# Patient Record
Sex: FEMALE | Race: WHITE | NOT HISPANIC OR LATINO | URBAN - METROPOLITAN AREA
[De-identification: names, ages, dates, MRNs, and addresses within clinical notes are randomized per-mention and may not be internally consistent; named-entity substitution may affect disease eponyms.]

---

## 2023-05-20 LAB
ABO GROUP (TYPE) IN BLOOD: NORMAL
ANTIBODY SCREEN: NORMAL
ESTIMATED AVERAGE GLUCOSE FOR HBA1C: 105 MG/DL
HEMOGLOBIN A1C/HEMOGLOBIN TOTAL IN BLOOD: 5.3 %
HEPATITIS B VIRUS SURFACE AG PRESENCE IN SERUM: NONREACTIVE
HEPATITIS C VIRUS AB PRESENCE IN SERUM: NONREACTIVE
HIV 1/ 2 AG/AB SCREEN: NONREACTIVE
RH FACTOR: NORMAL
THYROTROPIN (MIU/L) IN SER/PLAS BY DETECTION LIMIT <= 0.05 MIU/L: 7.6 MIU/L (ref 0.44–3.98)
THYROXINE (T4) FREE (NG/DL) IN SER/PLAS: 0.84 NG/DL (ref 0.61–1.12)

## 2023-05-21 LAB
CHLAMYDIA TRACH., AMPLIFIED: NEGATIVE
N. GONORRHEA, AMPLIFIED: NEGATIVE
SYPHILIS TOTAL AB: NONREACTIVE

## 2023-06-12 LAB — PROGESTERONE (NG/ML) IN SER/PLAS: 15.9 NG/ML

## 2023-06-21 LAB — CHORIOGONADOTROPIN (MIU/ML) IN SER/PLAS: 39 MIU/ML

## 2023-06-23 LAB — CHORIOGONADOTROPIN (MIU/ML) IN SER/PLAS: 60 MIU/ML

## 2023-06-27 LAB — CHORIOGONADOTROPIN (MIU/ML) IN SER/PLAS: 388 MIU/ML

## 2023-06-29 LAB — CHORIOGONADOTROPIN (MIU/ML) IN SER/PLAS: 1012 MIU/ML

## 2023-07-07 LAB
ALANINE AMINOTRANSFERASE (SGPT) (U/L) IN SER/PLAS: 30 U/L (ref 7–45)
ALBUMIN (G/DL) IN SER/PLAS: 4 G/DL (ref 3.4–5)
ALKALINE PHOSPHATASE (U/L) IN SER/PLAS: 50 U/L (ref 33–110)
ANION GAP IN SER/PLAS: 13 MMOL/L (ref 10–20)
ASPARTATE AMINOTRANSFERASE (SGOT) (U/L) IN SER/PLAS: 20 U/L (ref 9–39)
BILIRUBIN TOTAL (MG/DL) IN SER/PLAS: 0.3 MG/DL (ref 0–1.2)
CALCIUM (MG/DL) IN SER/PLAS: 8.6 MG/DL (ref 8.6–10.3)
CARBON DIOXIDE, TOTAL (MMOL/L) IN SER/PLAS: 25 MMOL/L (ref 21–32)
CHLORIDE (MMOL/L) IN SER/PLAS: 102 MMOL/L (ref 98–107)
CHORIOGONADOTROPIN (MIU/ML) IN SER/PLAS: 82 MIU/ML
CREATININE (MG/DL) IN SER/PLAS: 0.62 MG/DL (ref 0.5–1.05)
ERYTHROCYTE DISTRIBUTION WIDTH (RATIO) BY AUTOMATED COUNT: 14.7 % (ref 11.5–14.5)
ERYTHROCYTE MEAN CORPUSCULAR HEMOGLOBIN CONCENTRATION (G/DL) BY AUTOMATED: 31.5 G/DL (ref 32–36)
ERYTHROCYTE MEAN CORPUSCULAR VOLUME (FL) BY AUTOMATED COUNT: 83 FL (ref 80–100)
ERYTHROCYTES (10*6/UL) IN BLOOD BY AUTOMATED COUNT: 4.45 X10E12/L (ref 4–5.2)
GFR FEMALE: >90 ML/MIN/1.73M2
GLUCOSE (MG/DL) IN SER/PLAS: 81 MG/DL (ref 74–99)
HEMATOCRIT (%) IN BLOOD BY AUTOMATED COUNT: 37.1 % (ref 36–46)
HEMOGLOBIN (G/DL) IN BLOOD: 11.7 G/DL (ref 12–16)
LEUKOCYTES (10*3/UL) IN BLOOD BY AUTOMATED COUNT: 8.2 X10E9/L (ref 4.4–11.3)
NRBC (PER 100 WBCS) BY AUTOMATED COUNT: 0 /100 WBC (ref 0–0)
PLATELETS (10*3/UL) IN BLOOD AUTOMATED COUNT: 293 X10E9/L (ref 150–450)
POTASSIUM (MMOL/L) IN SER/PLAS: 4 MMOL/L (ref 3.5–5.3)
PROTEIN TOTAL: 6.7 G/DL (ref 6.4–8.2)
SODIUM (MMOL/L) IN SER/PLAS: 136 MMOL/L (ref 136–145)
UREA NITROGEN (MG/DL) IN SER/PLAS: 6 MG/DL (ref 6–23)

## 2023-07-13 LAB — CHORIOGONADOTROPIN (MIU/ML) IN SER/PLAS: 8 MIU/ML

## 2023-07-20 LAB — CHORIOGONADOTROPIN (MIU/ML) IN SER/PLAS: 2 MIU/ML

## 2023-08-03 LAB
ESTRADIOL (PG/ML) IN SER/PLAS: 69 PG/ML
PROGESTERONE (NG/ML) IN SER/PLAS: 0.4 NG/ML

## 2023-09-05 LAB
PROGESTERONE (NG/ML) IN SER/PLAS: 18.3 NG/ML
THYROTROPIN (MIU/L) IN SER/PLAS BY DETECTION LIMIT <= 0.05 MIU/L: 1.94 MIU/L (ref 0.44–3.98)

## 2023-09-18 LAB — CHORIOGONADOTROPIN (MIU/ML) IN SER/PLAS: <2 MIU/ML

## 2023-10-03 DIAGNOSIS — N97.9 FEMALE INFERTILITY: ICD-10-CM

## 2023-10-09 ENCOUNTER — CLINICAL SUPPORT (OUTPATIENT)
Dept: ENDOCRINOLOGY | Facility: CLINIC | Age: 23
End: 2023-10-09
Payer: COMMERCIAL

## 2023-10-09 ENCOUNTER — LAB (OUTPATIENT)
Dept: LAB | Facility: LAB | Age: 23
End: 2023-10-09
Payer: COMMERCIAL

## 2023-10-09 DIAGNOSIS — N97.9 FEMALE INFERTILITY: ICD-10-CM

## 2023-10-09 LAB — PROGEST SERPL-MCNC: 38.7 NG/ML

## 2023-10-09 PROCEDURE — 84144 ASSAY OF PROGESTERONE: CPT

## 2023-10-09 PROCEDURE — 36415 COLL VENOUS BLD VENIPUNCTURE: CPT

## 2023-10-09 NOTE — PROGRESS NOTES
Patient to have CD23 P4 level after Clomid Letrozole combo cycle for TIC. Will reach out to patient this afternoon with results and plan.   MARISEL ULLOA on 10/9/23 at 9:28 AM.    HUDDLE PROVIDER NOTE  Ultrasound and/or labs reviewed at huddle.   No results found for this or any previous visit (from the past 96 hour(s)).  None  RN to add for huddle and discussion tomorrow.   Dina Lara

## 2023-10-10 ENCOUNTER — CLINICAL SUPPORT (OUTPATIENT)
Dept: ENDOCRINOLOGY | Facility: CLINIC | Age: 23
End: 2023-10-10
Payer: COMMERCIAL

## 2023-10-10 NOTE — PROGRESS NOTES
Patient had CD 23 P4 level drawn that resulted yesterday evening. Will call patient with results and plan.    MARISEL ULLOA on 10/10/23 at 8:33 AM.    Results for orders placed or performed in visit on 10/09/23 (from the past 24 hour(s))   Progesterone   Result Value Ref Range    Progesterone 38.7 ng/mL     Ovulatory P4 level confirmed, patient to call with menses or +pregnancy test    MARY ANNE LING on 10/10/23 at 1:31 PM.      Called patient to review MD plan below. Patient is aware to call the office with a +UPT or period. No questions at this time.    MARISEL ULLOA on 10/10/23 at 2:09 PM.

## 2023-10-16 ENCOUNTER — TELEPHONE (OUTPATIENT)
Dept: MATERNAL FETAL MEDICINE | Facility: CLINIC | Age: 23
End: 2023-10-16
Payer: COMMERCIAL

## 2023-10-16 DIAGNOSIS — Z31.89 ENCOUNTER FOR ARTIFICIAL INSEMINATION: ICD-10-CM

## 2023-10-16 DIAGNOSIS — N97.9 FEMALE INFERTILITY: ICD-10-CM

## 2023-10-16 NOTE — Clinical Note
This patient has her boarding pass signed off from October but Radha didn't put on there that she can do three cycles. So the boarding pass just needs an addendum that she can do two additional cycles. She started her period on Sunday so she will need her clomid and letrozole signed off today. She did her first IUI in October and got pregnant but miscarried then she was cleared to proceed with another IUI. I sent all her orders as well for the IUI.

## 2023-10-16 NOTE — PROGRESS NOTES
Boarding Pass Oral/Injectible with TIC/IUI Checklist    Age: 23 y.o.  No obstetric history on file.  Provider: Radha Smith CNP  Primary RN: Zenobia   Reasons for Treatment: Polycystic Ovarian Syndrome  Last BMI  04/28/23 : 45.70 kg/m²       No past medical history on file.  Ectopic Risk: N    Date Done Consultation Results/Comments   4/28/2023 Medication Protocol Stimulation protocol: Clomid 100 mg & letrozole 5 mg days 3-7/monitoring/IUI/ with HCG trigger + LP prometrium 100 mg BID PV to start 3 days after IUI x 2 additional cycles  Trigger plan: HCG  Adjuncts:  none  Notes: N/A   12/26/23 Insurance Financially Cleared? No coverage  Procedure Order Placed? Yes    N/A  Financial Consult YES   6/29/22 Genetic Carrier Screening Clearance DECLINED SCREEN   7/14/22 MFM Consult Okay to proceed? Yes   N/a Psych Consult Okay to proceed? N/A   6/29/22 Genetics Consult Okay to proceed? yes    Other    Date Done Female Labs Results/Comments   5/20/2023 T&S (Q 1 Year) ABO: O  Rh: POS  Antibody: NEG   5/20/2023 Hep B sAg NONREACTIVE   5/20/2023 Hep C AB NONREACTIVE   5/20/2023 HIV NONREACTIVE   5/20/2023 Syphilis NONREACTIVE   5/20/2023 GC/CT GC: NEGATIVE  CT: NEGATIVE   6/24/2022 Rubella (Q 5 Years) POSITIVE     6/24/2022 Varicella (Q 5 Years) POSITIVE     11/20/2023 TSH 1.59 (Ref range: 0.44 - 3.98 mIU/L)   11/20/2023 HgbA1C 5.1 (Ref range: see below %)   6/29/2022 AMH 5.56   declined Carrier Screening Myriad 2bP:   Declined  N/A   12/27/2022 Uterine Cavity Eval HSG: FL HYSTEROSALPINGOGRAM (12/27/2022):   (Dec 2022) Normal uterine contour without filling defects, bilateral tubal patency     SIS:     Hyster:     7/19/2022 Pap Smear (Q 5 Years)   Negative for Intraepithelial lesion or malignancy.     Mammogram ( > 40) (Q 1 Year)               Date Done Male Labs (required if IUI)  Heath Vaz ([id number]) Results/Comments   10/16/2023 Hep B sAg Nonreactive   10/16/2023 Hep C AB  Nonreactive   10/16/2023 HIV Nonreactive    10/16/2023 Syphilis Nonreactive   10/16/2023 GC/CT GC: Negative  CT: Negative   N/a Carrier Screening   Declined  N/A   5/2022 Semen Analysis  SA: at [facility] May 2022  Volume: 1.2 ml  Concentration: 74.8 million/ml  Motility: 61 %  Total Sperm: 89.76 million  TMC: 54.75  Morphology: 9%   Date Done Miscellaneous Results/Comments   5/20/2023 BMI Checklist  BMI > 40 or < 18 Added to chart:   Yes; Boarding Pass BMI Checklist (BMI > 40 or < 18)    Date Done Testing Results   11/28/2023 CBC Plt: 313 (Ref range: 150 - 450 x10*3/uL)  Hct: 37.2 (Ref range: 36.0 - 46.0 %)   11/28/2023 CMP BUN: 9 (Ref range: 6 - 23 mg/dL)  Cre: 0.72 (Ref range: 0.50 - 1.05 mg/dL)  AST: 30 (Ref range: 9 - 39 U/L)  ALT: 52 (H; Ref range: 7 - 45 U/L)   11/20/2023 HgbA1C 5.1 (Ref range: see below %)   7/14/2022 MFM Clearance (required for BMI > 40 or < 18) Clearance Letter-Provider Reviewed: YES         6/24/22 Weight Loss Consult (must be offered for BMI > 40) Declined       N/A >= 45 Checklist  Added to chart:   No     Boarding pass reviewed with MACKENZIE Herr. Pt is cleared to proceed with Clomid 100 mg and Letrozole 5 mg CD 3-7 with ultrasound monitoring hcg trigger and partner IUI with LP prometrium   LUIS ROGERS on 10/23/23 at 4:01 PM.       Boarding Pass reviewed with PARESH Herr RN and is complete. patient & partner are is cleared for fertility treatment: Clomid 100 mg & letrozole 5 mg days 3-7/monitoring/IUI/ with HCG trigger + LP prometrium 100 mg BID PV to start 3 days after IUI x 2 additional cycles.   12/26/23 at 3:55 PM - SAÚL Chun-CNP

## 2023-10-16 NOTE — TELEPHONE ENCOUNTER
Called patient back. Patient started full flow menses today 10/16. Patient wants to proceed with IUI. Patient will need new boarding pass signed off. Patient's  needs updated std screen. Orders placed. Patient will call back Wednesday to check status to start clomid/letrozole and get scheduled for monitoring for IUI. All orders placed and cycle started aside from ordering meds. Will order meds once bp is signed off. Patient does need a financial consult. Email sent to get patient placed on FC list.   KARI CANCNIO on 10/16/23 at 3:18 PM.

## 2023-10-20 ENCOUNTER — TELEPHONE (OUTPATIENT)
Dept: ENDOCRINOLOGY | Facility: CLINIC | Age: 23
End: 2023-10-20
Payer: COMMERCIAL

## 2023-10-20 DIAGNOSIS — N97.9 FEMALE INFERTILITY: Primary | ICD-10-CM

## 2023-10-20 RX ORDER — LETROZOLE 2.5 MG/1
5 TABLET, FILM COATED ORAL DAILY
Qty: 10 TABLET | Refills: 0 | Status: SHIPPED | OUTPATIENT
Start: 2023-10-20 | End: 2023-10-25

## 2023-10-20 RX ORDER — CLOMIPHENE CITRATE 50 MG/1
50 TABLET ORAL DAILY
Qty: 4 TABLET | Refills: 0 | Status: SHIPPED | OUTPATIENT
Start: 2023-10-20 | End: 2023-10-24

## 2023-10-20 NOTE — TELEPHONE ENCOUNTER
Telephone Encounter    Katy Roldan paged the answering service tonight. She has completed 19 clomid/letrozole TIC cycles and desires to proceed with IUI. Her partner had STD screening this week and she was waiting for a phone call for confirmation. Per RN notes, pt was to call on 10/18 for boarding pass sign off.    Although BP not officially signed off, since patient has been receiving clomid/let in the past, okay to send Rx tonight. Explained to patient that without prior authorization, she may need to pay for IUI out of pocket. Additionally, stated for actual IUI process to be completed she will need her BP signed off. Pt may convert thi to a OI/TIC month if she is unable to complete everything before next cycle. I will send a message to the RN pool and pt to call on Monday for BP sign off.    D/w Dr. Marco Mejia MD PGY 5  Reproductive Endocrinology and Infertility Fellow

## 2023-10-23 ENCOUNTER — TELEPHONE (OUTPATIENT)
Dept: ENDOCRINOLOGY | Facility: CLINIC | Age: 23
End: 2023-10-23
Payer: COMMERCIAL

## 2023-10-23 ENCOUNTER — DOCUMENTATION (OUTPATIENT)
Dept: ENDOCRINOLOGY | Facility: CLINIC | Age: 23
End: 2023-10-23
Payer: COMMERCIAL

## 2023-10-23 RX ORDER — PROGESTERONE 100 MG/1
100 CAPSULE ORAL 2 TIMES DAILY
Qty: 60 CAPSULE | Refills: 1 | Status: SHIPPED | OUTPATIENT
Start: 2023-10-23 | End: 2023-12-26 | Stop reason: SDUPTHER

## 2023-10-23 RX ORDER — CHORIONIC GONADOTROPIN 10000 UNIT
10000 KIT INTRAMUSCULAR ONCE
Qty: 1 EACH | Refills: 0 | Status: SHIPPED | OUTPATIENT
Start: 2023-10-23 | End: 2023-12-27 | Stop reason: SDUPTHER

## 2023-10-23 NOTE — TELEPHONE ENCOUNTER
Pt calling to talk about plan. Pt needs boarding pass signed off. Will review with Radha Smith today. Patient already did letrozole and clomid combo this cycle starting CD 4. Patient will come in for US monitoring for IUI. Patient tentatively scheduled for 10/27 CD 12. Patient has all meds ordered for IUI. Will call patient if anything changes with the plan after boarding pass is signed off.   KARI CANCINO on 10/23/23 at 3:48 PM.

## 2023-10-24 PROBLEM — N97.0 FEMALE INFERTILITY ASSOCIATED WITH ANOVULATION: Status: ACTIVE | Noted: 2023-10-24

## 2023-10-24 PROBLEM — E03.9 ACQUIRED HYPOTHYROIDISM: Status: ACTIVE | Noted: 2017-03-11

## 2023-10-24 PROBLEM — E66.01 OBESITY, CLASS III, BMI 40-49.9 (MORBID OBESITY) (MULTI): Status: ACTIVE | Noted: 2018-05-22

## 2023-10-24 PROBLEM — F32.A ANXIETY AND DEPRESSION: Status: ACTIVE | Noted: 2017-03-11

## 2023-10-24 PROBLEM — E66.813 OBESITY, CLASS III, BMI 40-49.9 (MORBID OBESITY): Status: ACTIVE | Noted: 2018-05-22

## 2023-10-24 PROBLEM — H52.13 MYOPIA OF BOTH EYES WITH ASTIGMATISM: Status: ACTIVE | Noted: 2021-03-22

## 2023-10-24 PROBLEM — E28.2 PCOS (POLYCYSTIC OVARIAN SYNDROME): Status: ACTIVE | Noted: 2021-05-20

## 2023-10-24 PROBLEM — F41.9 ANXIETY AND DEPRESSION: Status: ACTIVE | Noted: 2017-03-11

## 2023-10-24 PROBLEM — E55.9 VITAMIN D DEFICIENCY: Status: ACTIVE | Noted: 2022-07-12

## 2023-10-24 PROBLEM — E66.813 CLASS 3 SEVERE OBESITY WITH SERIOUS COMORBIDITY AND BODY MASS INDEX (BMI) OF 50.0 TO 59.9 IN ADULT: Status: ACTIVE | Noted: 2018-05-22

## 2023-10-24 PROBLEM — K76.0 NAFL (NONALCOHOLIC FATTY LIVER): Status: ACTIVE | Noted: 2023-10-24

## 2023-10-24 PROBLEM — N91.5 OLIGOMENORRHEA: Status: ACTIVE | Noted: 2023-10-24

## 2023-10-24 PROBLEM — H52.203 MYOPIA OF BOTH EYES WITH ASTIGMATISM: Status: ACTIVE | Noted: 2021-03-22

## 2023-10-24 RX ORDER — MEDROXYPROGESTERONE ACETATE 10 MG/1
10 TABLET ORAL
COMMUNITY
End: 2024-02-20 | Stop reason: WASHOUT

## 2023-10-24 RX ORDER — LEVOTHYROXINE SODIUM 50 UG/1
TABLET ORAL
COMMUNITY
Start: 2020-08-31 | End: 2024-01-26 | Stop reason: ALTCHOICE

## 2023-10-24 RX ORDER — FLUOCINOLONE ACETONIDE 0.1 MG/ML
SOLUTION TOPICAL
COMMUNITY
Start: 2021-05-03 | End: 2024-02-20 | Stop reason: WASHOUT

## 2023-10-24 RX ORDER — SERTRALINE HYDROCHLORIDE 100 MG/1
100 TABLET, FILM COATED ORAL DAILY
COMMUNITY
Start: 2022-12-18 | End: 2024-04-22 | Stop reason: WASHOUT

## 2023-10-24 RX ORDER — NORETHINDRONE AND ETHINYL ESTRADIOL 1 MG-35MCG
KIT ORAL
COMMUNITY
Start: 2021-02-25 | End: 2024-02-20 | Stop reason: WASHOUT

## 2023-10-24 RX ORDER — ALBUTEROL SULFATE 90 UG/1
2 AEROSOL, METERED RESPIRATORY (INHALATION) EVERY 4 HOURS PRN
COMMUNITY
Start: 2014-03-04 | End: 2024-04-22 | Stop reason: WASHOUT

## 2023-10-24 RX ORDER — FLUTICASONE PROPIONATE 50 MCG
2 SPRAY, SUSPENSION (ML) NASAL DAILY
COMMUNITY
Start: 2021-05-20 | End: 2024-02-20 | Stop reason: WASHOUT

## 2023-10-24 RX ORDER — CLOMIPHENE CITRATE 50 MG/1
2 TABLET ORAL
COMMUNITY
Start: 2022-10-03 | End: 2023-11-22 | Stop reason: ALTCHOICE

## 2023-10-24 RX ORDER — LEVOTHYROXINE SODIUM 125 UG/1
125 TABLET ORAL
COMMUNITY
End: 2024-01-26 | Stop reason: ALTCHOICE

## 2023-10-24 RX ORDER — LEVOTHYROXINE SODIUM 112 UG/1
TABLET ORAL
COMMUNITY
Start: 2023-10-05 | End: 2024-01-26 | Stop reason: SINTOL

## 2023-10-24 RX ORDER — VENLAFAXINE HYDROCHLORIDE 75 MG/1
75 CAPSULE, EXTENDED RELEASE ORAL DAILY
COMMUNITY
Start: 2020-12-28 | End: 2024-02-20 | Stop reason: WASHOUT

## 2023-10-24 RX ORDER — LEVOTHYROXINE SODIUM 75 UG/1
75 TABLET ORAL DAILY
COMMUNITY
End: 2024-01-26 | Stop reason: ALTCHOICE

## 2023-10-24 RX ORDER — KETOCONAZOLE 20 MG/ML
SHAMPOO, SUSPENSION TOPICAL
COMMUNITY
Start: 2021-05-03 | End: 2024-02-20 | Stop reason: WASHOUT

## 2023-10-24 RX ORDER — ACETAMINOPHEN 500 MG
TABLET ORAL
COMMUNITY
Start: 2022-06-29 | End: 2024-02-20 | Stop reason: WASHOUT

## 2023-10-24 RX ORDER — CLOBETASOL PROPIONATE 0.46 MG/ML
SOLUTION TOPICAL
COMMUNITY
End: 2024-02-20 | Stop reason: WASHOUT

## 2023-10-24 RX ORDER — KETOCONAZOLE 20 MG/G
CREAM TOPICAL
COMMUNITY
Start: 2022-07-12 | End: 2024-02-20 | Stop reason: WASHOUT

## 2023-10-24 RX ORDER — SERTRALINE HYDROCHLORIDE 50 MG/1
50 TABLET, FILM COATED ORAL DAILY
COMMUNITY
End: 2024-02-20 | Stop reason: WASHOUT

## 2023-10-24 RX ORDER — PHENTERMINE HYDROCHLORIDE 37.5 MG/1
TABLET ORAL
COMMUNITY
Start: 2023-04-12 | End: 2023-11-22 | Stop reason: ALTCHOICE

## 2023-10-24 RX ORDER — B-COMPLEX WITH VITAMIN C
1 TABLET ORAL
COMMUNITY
Start: 2022-05-09

## 2023-10-24 RX ORDER — METFORMIN HYDROCHLORIDE 500 MG/1
1 TABLET, EXTENDED RELEASE ORAL
COMMUNITY
Start: 2023-10-05 | End: 2024-03-22 | Stop reason: WASHOUT

## 2023-10-24 RX ORDER — LEVOTHYROXINE SODIUM 88 UG/1
88 TABLET ORAL DAILY
COMMUNITY
Start: 2023-05-07 | End: 2024-01-26 | Stop reason: ALTCHOICE

## 2023-10-24 RX ORDER — NITROFURANTOIN 25; 75 MG/1; MG/1
100 CAPSULE ORAL 2 TIMES DAILY
COMMUNITY
Start: 2018-02-16 | End: 2024-02-20 | Stop reason: WASHOUT

## 2023-10-25 ENCOUNTER — SPECIALTY PHARMACY (OUTPATIENT)
Dept: PHARMACY | Facility: CLINIC | Age: 23
End: 2023-10-25

## 2023-10-25 DIAGNOSIS — N97.9 FEMALE INFERTILITY: ICD-10-CM

## 2023-10-25 RX ORDER — LETROZOLE 2.5 MG/1
5 TABLET, FILM COATED ORAL DAILY
Refills: 0 | OUTPATIENT
Start: 2023-10-25 | End: 2023-10-30

## 2023-10-27 ENCOUNTER — ANCILLARY PROCEDURE (OUTPATIENT)
Dept: ENDOCRINOLOGY | Facility: CLINIC | Age: 23
End: 2023-10-27
Payer: COMMERCIAL

## 2023-10-27 ENCOUNTER — PHARMACY VISIT (OUTPATIENT)
Dept: PHARMACY | Facility: CLINIC | Age: 23
End: 2023-10-27
Payer: COMMERCIAL

## 2023-10-27 DIAGNOSIS — N97.9 FEMALE INFERTILITY: ICD-10-CM

## 2023-10-27 LAB
ESTRADIOL SERPL-MCNC: 242 PG/ML
LH SERPL-ACNC: 7.9 IU/L

## 2023-10-27 PROCEDURE — 36415 COLL VENOUS BLD VENIPUNCTURE: CPT

## 2023-10-27 PROCEDURE — 83002 ASSAY OF GONADOTROPIN (LH): CPT

## 2023-10-27 PROCEDURE — RXMED WILLOW AMBULATORY MEDICATION CHARGE

## 2023-10-27 PROCEDURE — 82670 ASSAY OF TOTAL ESTRADIOL: CPT

## 2023-10-27 PROCEDURE — 76857 US EXAM PELVIC LIMITED: CPT

## 2023-10-27 NOTE — PROGRESS NOTES
Pt calling to talk about plan. Pt needs boarding pass signed off. Will review with Radha Smith today. Patient already did letrozole and clomid combo this cycle starting CD 4. Patient will come in for US monitoring for IUI. Patient tentatively scheduled for 10/27 CD 12. Patient has all meds ordered for IUI. Will call patient if anything changes with the plan after boarding pass is signed off.   KARI CANCINO M on 10/23/23 at 3:48 PM.        Here for US and labs with partner.   Reveiwed US with pt and that shehas 3-4 follicles that could ovulate; coupe is aware of risk of 3+ gestation; would reduce if medically necessary; both agree to proceed with IUI. Pt has trigger on order and will call the pharmacy to have delivered today.   Nurse will call pt this afternoon with plan.     10/27/23 at 7:39 AM - Abby Crawford RN      [facility] PROVIDER NOTE - TRIGGER  Ultrasound and/or labs reviewed at [facility]. Patient ready for trigger.   Results for orders placed or performed in visit on 10/27/23 (from the past 96 hour(s))   Luteinizing Hormone (LH)   Result Value Ref Range    Luteinizing Hormone 7.9 IU/L   Estradiol   Result Value Ref Range    Estradiol 242 pg/mL         Will tomorrow tonight between 8-10 pm for IUI 10/30/23.     González Suárez      TC to pt to let her know the plan to trigger tomorrow eveingin between 8 & 10 PM for IUI on 10/30. Nurse will e-mail Pregnyl instructions. Transferred to  to schedule appt.     10/27/23 at 2:46 PM - Abby Crawford RN

## 2023-10-30 ENCOUNTER — PROCEDURE VISIT (OUTPATIENT)
Dept: ENDOCRINOLOGY | Facility: CLINIC | Age: 23
End: 2023-10-30
Payer: COMMERCIAL

## 2023-10-30 DIAGNOSIS — N97.9 FEMALE INFERTILITY: ICD-10-CM

## 2023-10-30 PROCEDURE — 58322 ARTIFICIAL INSEMINATION: CPT | Performed by: NURSE PRACTITIONER

## 2023-10-30 PROCEDURE — 89261 SPERM ISOLATION COMPLEX: CPT | Performed by: OBSTETRICS & GYNECOLOGY

## 2023-10-30 NOTE — PROGRESS NOTES
"Patient ID: Katy Roldan is a 23 y.o. female.    Intrauterine Insemination (IUI)    Date/Time: 10/30/2023 9:18 AM    Performed by: ZAFAR Orozco  Authorized by: ZAFAR Orozco    Consent:     Consent obtained:  Verbal and written    Consent given by:  Patient    Procedure risks and benefits discussed: yes      Patient questions answered: yes      Patient agrees, verbalizes understanding, and wants to proceed: yes      Educational handouts given: yes      Instructions and paperwork completed: yes    Procedure:     Specimen source: partner      Specimen condition: fresh      Pelvic exam performed: yes      Speculum placed in vagina: yes      Tenaculum applied to cervix: no      Type of insemination: intrauterine insemination      Ultrasound guidance: No      Speculum type: Octavia      Catheter type: curved      Curvature: mild      Difficulty: easy      Estimated Blood Loss:  None    Specimen Return: none    Post-procedure:     Patient tolerated procedure well: yes      Post-procedure plan: patient counseled on signs and symptoms for which to call and/or return to clinic    Comments:     Procedure comments:  IUI Procedure note:    The patient presented today for IUI.     Consent signed by patient, IUI sample identified by patient, and Final Verification performed with patient via electronic system \"\" prior to insemination.   All patient's questions were discussed and answered.          Chaperone offered to patient for intimate exam: Patient declined chaperone  Name of chaperone: N/A    Specimen Source: Partner  Specimen Condition: Fresh  TMS 9.85 mil    Additional notes:    Patient was advised to call office if she develops fever, chills, pelvic pain, or heavy bleeding.    Progesterone and post-IUI teaching completed. Will start Progesterone three days after IUI and continue twice daily. Pt will do a home pregnancy test two weeks from IUI date. If home pregnancy test positive, patient " will continue Progesterone and call office to schedule BHCG. If home pregnancy test negative, patient will discontinue Progesterone, and was advised to call office with start of menses; will proceed with another cycle if appropriate.  Patient verbalized understanding of plan.    SAÚL Orozco-CNP

## 2023-11-07 NOTE — PROGRESS NOTES
Medications ordered for upcoming IUI cycle.    Patient is being seen by the  Fertility clinic for the following:     Treatment plan:   IUI #1  Reasons for treatment: Polycystic Ovarian Syndrome  Treatment type: Non-ART  Treatment start date: 10/16/2023       Treatment type: Non-ART    CRISTEL Cycle Additional Details    Provider: Radha Smith CNP  Medication Protocol  Non-IVF: CC, Let, Monitoring, Trigger, IUI, LP prog  Adjuncts: ASA 81mg  Cycle Notes: Baby ASA at 12 weeks per MFM.      The following medications were sent into  [facility] on 10/23/23 for the above treatment:   Pregnyl 10,000IU vial for trigger         Estela Alvarado, PharmD

## 2023-11-14 ENCOUNTER — LAB (OUTPATIENT)
Dept: LAB | Facility: LAB | Age: 23
End: 2023-11-14
Payer: COMMERCIAL

## 2023-11-14 ENCOUNTER — TELEPHONE (OUTPATIENT)
Dept: ENDOCRINOLOGY | Facility: CLINIC | Age: 23
End: 2023-11-14

## 2023-11-14 DIAGNOSIS — Z32.01 POSITIVE URINE PREGNANCY TEST (HHS-HCC): ICD-10-CM

## 2023-11-14 LAB — B-HCG SERPL-ACNC: 9 MIU/ML

## 2023-11-14 PROCEDURE — 84702 CHORIONIC GONADOTROPIN TEST: CPT

## 2023-11-14 PROCEDURE — 36415 COLL VENOUS BLD VENIPUNCTURE: CPT

## 2023-11-14 NOTE — PROGRESS NOTES
Initial HCG: Patient called with + HPT  Patient's CRISTEL Provider: Radha Smith CNP  Infertility dx: Polycystic Ovarian Syndrome  Achieved pregnancy by: IUI on 10/30/2023  Fertility medications used this cycle: Clomid and letrozole  LMP: Patient's last menstrual period was 10/16/2023  EGA based on (IUI date, ET ): IUI date = 4w1d    Updated G/P with this pregnancy: G P0  OB HX:  7/2023 - SAB    Type & Screen: 5/20/2023 O POSITIVE   History of miscarriage: Yes, MAB in 7/2023  History of pelvic/abdominal surgeries: No  History of STDs/PID: No  Hx of ectopic: No  Hx of tubal disease/ blockage: No    Risk for ectopic: No      Current medications:     PNV: Yes  Vitamin D 2000 IUs: Yes  Luteal support: Prometrium 100 mg PV BID  Additional medications:   Zoloft 100mg daily  Levothyroxine 112mcg daily plus an extra tablet twice a week  Metformin 1000mg daily       Labs ordered/Plan:   BhCG ordered. Team will reach out to patient with results and plan. Patient aware to obtain bhcg later today or tomorrow. Will put in noon huddle to review results tomorrow.   Discussed early pregnancy versus miscarriage versus ectopic. Precautions given.   Patient expresses understanding of plan and is agreeable.    Anastasiia Silva  11/14/2023  9:33 AM

## 2023-11-15 DIAGNOSIS — Z32.00 UNCONFIRMED PREGNANCY: Primary | ICD-10-CM

## 2023-11-15 NOTE — PROGRESS NOTES
NOTE FROM 11/14:   Initial HCG: Patient called with + HPT   Patient's CRISTEL Provider: Radha Smith CNP  Infertility dx: Polycystic Ovarian Syndrome  Achieved pregnancy by: IUI on 10/30/2023  Fertility medications used this cycle: Clomid and letrozole  LMP: Patient's last menstrual period was 10/16/2023  EGA based on (IUI date, ET ): IUI date = 4w1d     Updated G/P with this pregnancy: G P0  OB HX:  7/2023 - SAB     Type & Screen: 5/20/2023 O POSITIVE   History of miscarriage: Yes, MAB in 7/2023  History of pelvic/abdominal surgeries: No  History of STDs/PID: No  Hx of ectopic: No  Hx of tubal disease/ blockage: No     Risk for ectopic: No        Current medications:      PNV: Yes  Vitamin D 2000 IUs: Yes  Luteal support: Prometrium 100 mg PV BID  Additional medications:   Zoloft 100mg daily  Levothyroxine 112mcg daily plus an extra tablet twice a week  Metformin 1000mg daily         Labs ordered/Plan:   BhCG ordered. Team will reach out to patient with results and plan. Patient aware to obtain bhcg later today or tomorrow. Will put in noon huddle to review results tomorrow.   Discussed early pregnancy versus miscarriage versus ectopic. Precautions given.   Patient expresses understanding of plan and is agreeable.     Anastasiia Silva  11/14/2023  9:33 AM     Component      Latest Ref Rng 11/14/2023   HCG, Beta-Quantitative      <5 mIU/mL 9 (H)       Legend:  (H) High     Patient had bhcg drawn yesterday at 4w1d. Bhcg=9. Will call patient with plan after meeting.  KARI CANCINO on 11/15/23 at 11:17 AM.      HUDDLE PROVIDER NOTE  Ultrasound and/or labs reviewed at huddle.   Results for orders placed or performed in visit on 11/14/23 (from the past 96 hour(s))   (Lab Collect) Human Chorionic Gonadotropin, Serum Quantitative   Result Value Ref Range    HCG, Beta-Quantitative 9 (H) <5 mIU/mL       RTC in one day for LABS ONLY VISIT and HCG.   González Suárez     Called patient with results, patient will go to a   lab tomorrow to have her bhcg repeated. Will place patient in huddle for tomorrow. Patient transferred to  to get scheduled for a follow up visit.   KARI CANCINO on 11/15/23 at 2:12 PM.

## 2023-11-16 ENCOUNTER — LAB (OUTPATIENT)
Dept: LAB | Facility: LAB | Age: 23
End: 2023-11-16
Payer: COMMERCIAL

## 2023-11-16 ENCOUNTER — TELEPHONE (OUTPATIENT)
Dept: ENDOCRINOLOGY | Facility: CLINIC | Age: 23
End: 2023-11-16

## 2023-11-16 DIAGNOSIS — Z32.00 UNCONFIRMED PREGNANCY: ICD-10-CM

## 2023-11-16 DIAGNOSIS — N97.9 FEMALE INFERTILITY: ICD-10-CM

## 2023-11-16 LAB — B-HCG SERPL-ACNC: 12 MIU/ML

## 2023-11-16 PROCEDURE — 36415 COLL VENOUS BLD VENIPUNCTURE: CPT

## 2023-11-16 PROCEDURE — 84702 CHORIONIC GONADOTROPIN TEST: CPT

## 2023-11-16 NOTE — PROGRESS NOTES
Patient to have bhcg repeated after level = 9 on 11/14 after Combo Clomid/Letrozole for IUI on 10/30. Will call patient with plan after meeting. Patient has FUV with Radha scheduled for Monday 11/20.      MARISEL ULLOA on 11/16/23 at 10:22 AM.      Patient went for labs today at 3:38pm. Will follow up with results tomorrow and call patient with plan.    MARISEL ULLOA on 11/16/23 at 3:40 PM.

## 2023-11-17 DIAGNOSIS — O02.81 INAPPROPRIATE CHANGE IN QUANTITATIVE HCG IN EARLY PREGNANCY (HHS-HCC): Primary | ICD-10-CM

## 2023-11-17 DIAGNOSIS — Z32.00 PREGNANCY EXAMINATION OR TEST, PREGNANCY UNCONFIRMED: ICD-10-CM

## 2023-11-17 RX ORDER — PROGESTERONE 100 MG/1
CAPSULE ORAL
Qty: 180 CAPSULE | Refills: 1 | OUTPATIENT
Start: 2023-11-17

## 2023-11-17 NOTE — PROGRESS NOTES
Initial HCG: Patient called with + [facility]   Patient's CRISTEL Provider: Radha Smith CNP  Infertility dx: Polycystic Ovarian Syndrome  Achieved pregnancy by: IUI on 10/30/2023  Fertility medications used this cycle: Clomid and letrozole  LMP: Patient's last menstrual period was 10/16/2023  EGA based on (IUI date, ET ): IUI date = 4w4d     Updated G/P with this pregnancy:   OB HX:  2023 - SAB     Type & Screen: 2023 O POSITIVE   History of miscarriage: Yes, MAB in 2023  History of pelvic/abdominal surgeries: No  History of STDs/PID: No  Hx of ectopic: No  Hx of tubal disease/ blockage: No     Risk for ectopic: No        Current medications:      PNV: Yes  Vitamin D 2000 IUs: Yes  Luteal support: Prometrium 100 mg PV BID  Additional medications:   Zoloft 100mg daily  Levothyroxine 112mcg daily plus an extra tablet twice a week  Metformin 1000mg daily                  Component      Latest Ref Rng 2023   HCG, Beta-Quantitative      <5 mIU/mL 9 (H)  12 (H)       Patient had repeat bhcg drawn after initial bhcg on  after clomid and letrozole for IUI on 10/30. Will call patient this afternoon with results and plan.    MARISEL ULLOA on 23 at 6:56 AM.      [facility] PROVIDER NOTE - HCG REVIEW  Ultrasound and/or labs reviewed at [facility].     Results for orders placed or performed in visit on 23 (from the past 96 hour(s))   (Lab Collect) Human Chorionic Gonadotropin, Serum Quantitative   Result Value Ref Range    HCG, Beta-Quantitative 12 (H) <5 mIU/mL       Lab Results   Component Value Date    HCGQUANT 12 (H) 2023    HCGQUANT 9 (H) 2023    HCGQUANT <2 2023    HCGQUANT 2 2023    HCGQUANT 8 (A) 2023    HCGQUANT 82 (A) 2023    HCGQUANT 1,012 (A) 2023    HCGQUANT 388 (A) 2023     Patient can stop prometrium.    RTC in four days for LABS ONLY VISIT and HCG.   Susi Ceja      Called patient to review results. Patient is going to repeat  bhcg on Tuesday 11/21. Patient is aware to stop her prometrium and will call if she gets her menses prior to repeat bhcg. Sent a message to FD to schedule patient for Tuesday in the noon huddle. Patient has VFUV with Radha Smith on Monday to review next steps.    MARISEL ULLOA on 11/17/23 at 1:36 PM.

## 2023-11-17 NOTE — TELEPHONE ENCOUNTER
Called patient after meeting to review results and plan. See meeting note for details.    MARISEL ULLOA on 11/17/23 at 1:38 PM.

## 2023-11-20 ENCOUNTER — LAB (OUTPATIENT)
Dept: LAB | Facility: LAB | Age: 23
End: 2023-11-20
Payer: COMMERCIAL

## 2023-11-20 ENCOUNTER — TELEMEDICINE (OUTPATIENT)
Dept: ENDOCRINOLOGY | Facility: CLINIC | Age: 23
End: 2023-11-20
Payer: COMMERCIAL

## 2023-11-20 DIAGNOSIS — E28.2 PCOS (POLYCYSTIC OVARIAN SYNDROME): ICD-10-CM

## 2023-11-20 DIAGNOSIS — Z32.01 POSITIVE BLOOD PREGNANCY TEST (HHS-HCC): ICD-10-CM

## 2023-11-20 DIAGNOSIS — N96 RECURRENT PREGNANCY LOSS WITHOUT CURRENT PREGNANCY: ICD-10-CM

## 2023-11-20 DIAGNOSIS — Z13.29 SCREENING FOR THYROID DISORDER: Primary | ICD-10-CM

## 2023-11-20 DIAGNOSIS — Z13.29 SCREENING FOR THYROID DISORDER: ICD-10-CM

## 2023-11-20 LAB — TSH SERPL-ACNC: 1.59 MIU/L (ref 0.44–3.98)

## 2023-11-20 PROCEDURE — 86146 BETA-2 GLYCOPROTEIN ANTIBODY: CPT

## 2023-11-20 PROCEDURE — 84702 CHORIONIC GONADOTROPIN TEST: CPT

## 2023-11-20 PROCEDURE — 83036 HEMOGLOBIN GLYCOSYLATED A1C: CPT

## 2023-11-20 PROCEDURE — 84146 ASSAY OF PROLACTIN: CPT

## 2023-11-20 PROCEDURE — 84443 ASSAY THYROID STIM HORMONE: CPT

## 2023-11-20 PROCEDURE — 36415 COLL VENOUS BLD VENIPUNCTURE: CPT

## 2023-11-20 PROCEDURE — 86376 MICROSOMAL ANTIBODY EACH: CPT

## 2023-11-20 PROCEDURE — 86147 CARDIOLIPIN ANTIBODY EA IG: CPT

## 2023-11-20 PROCEDURE — 99213 OFFICE O/P EST LOW 20 MIN: CPT | Performed by: NURSE PRACTITIONER

## 2023-11-20 PROCEDURE — 85613 RUSSELL VIPER VENOM DILUTED: CPT

## 2023-11-20 PROCEDURE — 85730 THROMBOPLASTIN TIME PARTIAL: CPT

## 2023-11-20 NOTE — PROGRESS NOTES
Virtual Visit    Follow Up Visit HPI    Patient is a 23 y.o.  female with Recurrent Pregnancy Loss and Polycystic Ovarian Syndrome presenting today for follow up visit.     Treatment to date:   3 cycles Clomid/Letrozole combo since pregnancy loss with TIC no conception  1 cycle clomid/letrozole combo with ultrasound monitoring hcg trigger and partner IUI, no conception    Testing to date:   Result Date Done   Type and Screen: O 2023   Rh: POS 2023   Antibody: No results found for requested labs within last 365 days.  No results found for requested labs within last 365 days.   Rubella: No results found for requested labs within last 365 days. 2022   Varicella: No results found for requested labs within last 365 days. 2022   TSH: 1.94 (Ref range: 0.44 - 3.98 mIU/L) 2023   AMH: No results found for requested labs within last 365 days. No results found for requested labs within last 365 days.   PRL: No results found for requested labs within last 365 days. No results found for requested labs within last 365 days.   Testosterone: No results found for requested labs within last 365 days. No results found for requested labs within last 365 days.   DHEAS: No results found for requested labs within last 365 days. No results found for requested labs within last 365 days.   FSH: No results found for requested labs within last 365 days. No results found for requested labs within last 365 days.   17 OHP: No results found for requested labs within last 365 days. No results found for requested labs within last 365 days.   Hepatitis B surface antigen: NONREACTIVE (Ref range: NONREACTIVE) 2023   Hepatitis C antibody: NONREACTIVE (Ref range: NONREACTIVE) 2023   HIV ½ Antigen Antibody screen with reflex: NONREACTIVE (Ref range: NONREACTIVE) 2023   Syphilis screening with reflex: No results found for requested labs within last 365 days. 2023   Other:     Hysterosalpingogram: FL  HYSTEROSALPINGOGRAM (12/27/2022):   Normal uterine contour without filling defect, bilateral tubal patency     Saline Infused Sonography: N/A    GYN Pelvic Ultrasound: US PELVIS TRANSVAGINAL (5/28/2023):     Partner SA: Normal    Treatment to date: Multiple cycles of Ovulation induction medication, history of only ovulating on combination of Clomid 100 and Letrozole 5, now s/p 1 cycle OI/partner IUI (suspect biochemical pregnancy).    No past medical history on file.  Past Surgical History:   Procedure Laterality Date    OTHER SURGICAL HISTORY  09/07/2022    [place] tooth extraction     Current Outpatient Medications on File Prior to Visit   Medication Sig Dispense Refill    albuterol 90 mcg/actuation inhaler Inhale 2 puffs every 4 hours if needed.      cholecalciferol (Vitamin D-3) 50 mcg (2,000 unit) capsule TAKE 2 CAPSULE      clobetasol (Temovate) 0.05 % external solution APPLY TO THE AFFECTED AREA(S) TWICE DAILY      clomiPHENE (Clomid) 50 mg tablet Take 2 tablets (100 mg) by mouth once daily. CD 3-7 with letrozole      fexofenadine HCl (ALLEGRA ORAL) Take by mouth once daily.      fluocinolone (Synalar) 0.01 % external solution as directed apply to scalp every night at bed time Externally 30 days      fluticasone (Flonase) 50 mcg/actuation nasal spray Administer 2 sprays into each nostril once daily.      ketoconazole (NIZOral) 2 % cream Apply to affected area twice daily as needed (rash in skin fold).      ketoconazole (NIZOral) 2 % shampoo as directed wash scalp daily in the shower, leave sit 3-5 minutes then rinse Externally 30 days      levothyroxine (Synthroid, Levoxyl) 112 mcg tablet Take 1 tablet by mouth once daily. Plus an extra tablet twice a week (total 9 tablets per week)      levothyroxine (Synthroid, Levoxyl) 125 mcg tablet Take 1 tablet (125 mcg) by mouth once daily.      levothyroxine (Synthroid, Levoxyl) 50 mcg tablet TAKE TWO TABLETS BY MOUTH MONDAY, WEDNESDAY, FRIDAY, SATURDAY AND SUNDAYS,  AND TAKE ONE TABLET BY MOUTH ONE TUESDAY AND THURSDAY      levothyroxine (Synthroid, Levoxyl) 75 mcg tablet Take 1 tablet (75 mcg) by mouth once daily.      levothyroxine (Synthroid, Levoxyl) 88 mcg tablet Take 1 tablet (88 mcg) by mouth once daily.      medroxyPROGESTERone (Provera) 10 mg tablet Take 1 tablet (10 mg) by mouth once daily.      metFORMIN  mg 24 hr tablet Take 1 tablet (500 mg) by mouth 2 times a day with meals.      nitrofurantoin, macrocrystal-monohydrate, (Macrobid) 100 mg capsule Take 1 capsule (100 mg) by mouth twice a day.      norethindrone-ethin estradioL (Alyacen , ,) 1-35 mg-mcg tablet Take 1 tablet by mouth once daily. continuously      norethindrone-ethin estradioL (Necon) 0.5-35 mg-mcg tablet Take 1 tablet by mouth once daily.      phentermine (Adipex-P) 37.5 mg tablet Take 1 tablet by mouth once daily for 30 days. BMI 53.12      prenatal vitamin, iron-folic, (Prenatal Vitamin) 27 mg iron-800 mcg folic acid tablet Take 1 tablet by mouth once daily.      progesterone (Prometrium) 100 mg capsule Insert 1 capsule (100 mg) into the vagina 2 times a day. Starting 3 days after IUI 60 capsule 1    sertraline (Zoloft) 100 mg tablet Take 1 tablet (100 mg) by mouth once daily.      sertraline (Zoloft) 50 mg tablet Take 1 tablet (50 mg) by mouth once daily. as directed      venlafaxine XR (Effexor-XR) 75 mg 24 hr capsule Take 1 capsule (75 mg) by mouth once daily.       No current facility-administered medications on file prior to visit.       BMI:   BMI Readings from Last 1 Encounters:   23 45.70 kg/m²     VITALS:  There were no vitals taken for this visit.  LMP: No LMP recorded.    ASSESSMENT   23 y.o.  (1 SAB, 1 suspected biochemical) female with anovulatory infertility, PCOS and now Recurrent pregnancy loss and the following pertinent medical issues: BMI 45 and NAFLD .  Bilateral tubal patency on HSG  AMH 5.56    COUNSELING  Discussed use of luteal phase progesterone.  Micronized progesterone (Prometrium) is an oral pill used vaginally to increase absorption to the uterus and decrease systemic side effects. It should be started 3 days post IUI, 4 days post + OPK, or 5 days post HCG trigger. A urine pregnancy test should be taken approximately two weeks after ovulation, if + stay on the progesterone and call the office, if negative stop the progesterone or it may delay the onset of menses.    We discussed that Clomid is used for ovulation induction. We discussed common side effects of Clomid including headaches, vision changes, hot flashes, mood changes, and breast tenderness.  We discussed that there is an increased risk of multiple gestation with Clomid approximately 10% for twins and less than 1% for triplets.  Counseled regarding option of selective reduction for higher order multiples.  We discussed that Letrozole is used for ovulation induction and that recent studies have found letrozole is superior to Clomid for ovulation induction in patients with PCOS. We discussed common side effects of Letrozole including headaches, vision changes, hot flashes, mood changes, and breast tenderness.  Letrozole is NOT FDA approved for the treatment of infertility and was initially associated with (in some studies) a higher risk of congenital anomalies, although this was not found in a more recent large study of patients taking Letrozole for unexplained infertility. Patients must take a pregnancy test prior to starting Letrozole each month. We discussed that there is an increased risk of multiple gestation with Letrozole approximately 10% for twins and less than 1% for triplets.  Counseled regarding option of selective reduction for higher order multiples.    We discussed potential etiologies for recurrent pregnancy loss.  We discussed that most miscarriages are due to chromosomal abnormalities, such as aneuploidy in the embryo, and that the majority of these are maternal in origin and are  sporadic, although some may be paternal in origin.  We discussed that most patients with recurrent pregnancy loss will go on to have healthy pregnancies.  We reviewed the workup for recurrent pregnancy loss, including laboratory testing and uterus structural evaluation.  We may also recommend maternal and paternal karyotypes to evaluate for the presence of an unbalanced translocation. If any testing is abnormal we will address the cause.  If all testing is normal, we can offer supportive care and early monitoring.  We often use progesterone for luteal support, although this treatment is empiric.  IVF with pre-implantation genetic testing was also discussed as an option for treatment of recurrent miscarriages.    Routine Testing  Fertility Center  STDs Within 1 year   Genetic carrier Waiver/Completed   T&S Within 1 year   AMH Within 1 year   TSH Within 1 year   Rubella/Varicella Within 5 years     BMI Testing  Fertility Center  CBC Within 1 year   CMP Within 1 year   HgbA1c Within 1 year   Mag, Phos, Vit D <18 Within 1 year   MFM > 40  REQ   Wt loss consult > 40 OPT     PLAN  Orders Placed This Encounter   Procedures    Thyroid Peroxidase (TPO) Antibody    Hemoglobin A1C    Cardiolipin Antibody    Beta-2 Glycoprotein Antibodies    Lupus Anticoag. With Interpretation[Page]    Prolactin    TSH with reflex to Free T4 if abnormal       FOLLOW UP   RPL labs ordered today  Defer SIS for now unless ultrasound demonstrates concern for uterine polyp or fibroid on next monitoring ultrasound  Defer parental karyotypes pt will consider if additional clinical loss  Continue Metformin Extended Release recommend 1500 mg at dinner daily  Continue Levothyroxine 112 mcg PO daily plus one extra pill twice weekly  Engaged MD  Take prenatal vitamins, vitamin D 2000 IUs daily  Discussed that treatment cannot proceed until checklist items are complete.   Additional testing for BMI < 18 or > 40: Yes.  Patient to schedule follow up visit  if no conception within next 2 cycles of Ovulation Induction/partner IUI. Advised patient to arrange this now with the .  Chart to primary nurse for care coordination and patient check list/education.  Plan: Repeat beta hcg today, suspect biochemical pregnancy (pt started menses 11/18/23). If HCG NEGATIVE ok to start clomid 100 and letrozole 5 mg combo for ultrasound monitoring hcg trigger and partner IUI cycle with LP prometrium 200 mg PV BID starting 3 days post IUI and low dose aspirin 81 mg PO daily . If no conception within 2 additional IUI attempts recommend FUV.       Radha Smith  11/20/2023  2:50 PM

## 2023-11-21 ENCOUNTER — LAB (OUTPATIENT)
Dept: LAB | Facility: LAB | Age: 23
End: 2023-11-21
Payer: COMMERCIAL

## 2023-11-21 DIAGNOSIS — O02.81 INAPPROPRIATE CHANGE IN QUANTITATIVE HCG IN EARLY PREGNANCY (HHS-HCC): ICD-10-CM

## 2023-11-21 DIAGNOSIS — Z32.01 POSITIVE BLOOD PREGNANCY TEST (HHS-HCC): ICD-10-CM

## 2023-11-21 LAB
B-HCG SERPL-ACNC: 71 MIU/ML
B2 GLYCOPROT1 IGA SER-ACNC: <0.6 U/ML
B2 GLYCOPROT1 IGG SER-ACNC: <1.4 U/ML
B2 GLYCOPROT1 IGM SER-ACNC: 1.2 U/ML
CARDIOLIPIN IGA SERPL-ACNC: <0.5 APL U/ML
CARDIOLIPIN IGG SER IA-ACNC: <1.6 GPL U/ML
CARDIOLIPIN IGM SER IA-ACNC: 1.2 MPL U/ML
EST. AVERAGE GLUCOSE BLD GHB EST-MCNC: 100 MG/DL
HBA1C MFR BLD: 5.1 %
PROLACTIN SERPL-MCNC: 14.4 UG/L (ref 3–20)
THYROPEROXIDASE AB SERPL-ACNC: 557 IU/ML

## 2023-11-21 PROCEDURE — 84702 CHORIONIC GONADOTROPIN TEST: CPT

## 2023-11-21 PROCEDURE — 36415 COLL VENOUS BLD VENIPUNCTURE: CPT

## 2023-11-21 NOTE — PROGRESS NOTES
Patient is a 23 y.o.  female with Recurrent Pregnancy Loss and Polycystic Ovarian Syndrome presenting today for follow up visit.     Plan: Repeat beta hcg today, suspect biochemical pregnancy (pt started menses 23). If HCG NEGATIVE ok to start clomid 100 and letrozole 5 mg combo for ultrasound monitoring hcg trigger and partner IUI cycle with LP prometrium 200 mg PV BID starting 3 days post IUI and low dose aspirin 81 mg PO daily . If no conception within 2 additional IUI attempts recommend FUV.   Radha Smith  2023  2:50 PM     BHCG =     Labs drawn yesterday, do not see bhcg drawn. LM with patient to let us know where she had them drawn to see if we can add on bhcg or else she will need to have it redrawn.    MARISEL ULLOA on 23 at 11:22 AM.    Will add on bhcg to labs patient had drawn yesterday, and will put in noon huddle for tomorrow to review.    MARISEL ULLOA on 23 at 3:05 PM.

## 2023-11-22 ENCOUNTER — ANCILLARY PROCEDURE (OUTPATIENT)
Dept: ENDOCRINOLOGY | Facility: CLINIC | Age: 23
End: 2023-11-22
Payer: COMMERCIAL

## 2023-11-22 ENCOUNTER — CONSULT (OUTPATIENT)
Dept: ENDOCRINOLOGY | Facility: CLINIC | Age: 23
End: 2023-11-22
Payer: COMMERCIAL

## 2023-11-22 DIAGNOSIS — O36.80X0 ENCOUNTER TO DETERMINE FETAL VIABILITY OF PREGNANCY, NOT APPLICABLE OR UNSPECIFIED FETUS (HHS-HCC): ICD-10-CM

## 2023-11-22 DIAGNOSIS — O02.81 INAPPROPRIATE CHANGE IN QUANTITATIVE HUMAN CHORIONIC GONADOTROPIN (HCG) IN EARLY PREGNANCY (HHS-HCC): Primary | ICD-10-CM

## 2023-11-22 DIAGNOSIS — O02.1 MISSED ABORTION (HHS-HCC): Primary | ICD-10-CM

## 2023-11-22 DIAGNOSIS — O02.81 INAPPROPRIATE CHANGE IN QUANTITATIVE HUMAN CHORIONIC GONADOTROPIN (HCG) IN EARLY PREGNANCY (HHS-HCC): ICD-10-CM

## 2023-11-22 DIAGNOSIS — O36.80X0 PREGNANCY OF UNKNOWN ANATOMIC LOCATION (HHS-HCC): ICD-10-CM

## 2023-11-22 LAB
B-HCG SERPL-ACNC: 34 MIU/ML
B-HCG SERPL-ACNC: 86 MIU/ML

## 2023-11-22 PROCEDURE — 36415 COLL VENOUS BLD VENIPUNCTURE: CPT

## 2023-11-22 PROCEDURE — 99213 OFFICE O/P EST LOW 20 MIN: CPT | Mod: 25 | Performed by: NURSE PRACTITIONER

## 2023-11-22 PROCEDURE — 76817 TRANSVAGINAL US OBSTETRIC: CPT

## 2023-11-22 PROCEDURE — 84702 CHORIONIC GONADOTROPIN TEST: CPT

## 2023-11-22 PROCEDURE — 99213 OFFICE O/P EST LOW 20 MIN: CPT | Performed by: NURSE PRACTITIONER

## 2023-11-22 NOTE — TELEPHONE ENCOUNTER
Pt went to lab yesterday, had bhcg done.    See Huddle note from today for details.  Elisha Olson RN 11/22/23 8:21 AM

## 2023-11-22 NOTE — PROGRESS NOTES
Component      Latest Ref Rng 2023   HCG, Beta-Quantitative      <5 mIU/mL 9 (H)  12 (H)  71 (H)       , Achieved Pregnancy with Partner IUI on 10/30  Took Clomid/Letrozole combo with US monitoring and HCG trigger.  Will follow up with plan.    Elisha Olson RN 23 7:59 AM      Pt called in to check on results, reports a slow in bleeding, was bleeding like a period -.  Is still spotting/lightly bleeding and passing clots size of quarter or smaller.  She is not experiencing any pain.  Will speak to providers and follow up with her with a plan.  Patient verbalized understanding.   Elisha Olson RN 23 8:22 AM      Called patient- instructed her to go to [facility] [address] for OB scan and lab work at 0945 for rule out ectopic pregnancy.  She verbalized understanding.  Orders pended to Radha Smith.  Elisha Olson RN 23 8:47 AM

## 2023-11-22 NOTE — PROGRESS NOTES
In Person    Pt presented for OB ultrasound to r/o ectopic r/t vaginal bleeding in first trimester and abnormal serial hcg rise.   Conceived on clomid 100/letrozole 5 mg with partner IUI.     OB Scan    Ectopic risk factor: no  PGTA/PGTM: no  Blood type: O+  Vaginal bleeding: yes  Pelvic Pain; pt denies     Reviewed results from OB ultrasound today and results reviewed with pt as follows:     OB Scan results:   Assigned dating based on the conception date (IUI), selected on 11/22/2023     5 w + 2 d 7/22/2024  Assessment Cardiac activity: No Fetal Pole seen  Early placenta: to early to evaluate.    Component      Latest Ref Rng 11/20/2023 11/21/2023 11/22/2023   HCG, Beta-Quantitative      <5 mIU/mL 86 (H)  71 (H)  34 (H)         Detailed discussion with patient about her scan results, pregnancy, and next steps:        Plan:   Pregnancy of unknown location, hcg from today trending down; suspect biochemical pregnancy that is resolving, pt will repeat hcg in one week   Advised to call with bleeding, pain or concerns.    Ultrasound results and Plan of care reviewed with MD Radha Asher  11/22/2023  2:03 PM

## 2023-11-24 LAB
DRVVT SCREEN TO CONFIRM RATIO: 1.35 RATIO
DRVVT/DRVVT CFM NRMLZD PPP-RTO: 1.11 RATIO
DRVVT/DRVVT CFM P DOAC NEUT NORM PPP-RTO: 1.22 RATIO
LA 2 SCREEN W REFLEX-IMP: ABNORMAL
NORMALIZED SCT PPP-RTO: 1.1 RATIO
SILICA CLOTTING TIME CONFIRMATION: 1.21 RATIO
SILICA CLOTTING TIME SCREEN: 1.33 RATIO

## 2023-11-27 NOTE — PROGRESS NOTES
Patient to get repeat bhcg today. Patient has not gone yet. Will add to noon mary jane tomorrow for review.   KARI CANCINO on 11/27/23 at 12:18 PM.

## 2023-11-28 ENCOUNTER — APPOINTMENT (OUTPATIENT)
Dept: LAB | Facility: LAB | Age: 23
End: 2023-11-28
Payer: COMMERCIAL

## 2023-11-28 ENCOUNTER — LAB (OUTPATIENT)
Dept: LAB | Facility: LAB | Age: 23
End: 2023-11-28
Payer: COMMERCIAL

## 2023-11-28 DIAGNOSIS — O36.80X0 ENCOUNTER TO DETERMINE FETAL VIABILITY OF PREGNANCY, NOT APPLICABLE OR UNSPECIFIED FETUS (HHS-HCC): ICD-10-CM

## 2023-11-28 DIAGNOSIS — O02.1 MISSED ABORTION (HHS-HCC): ICD-10-CM

## 2023-11-28 DIAGNOSIS — O02.81 INAPPROPRIATE CHANGE IN QUANTITATIVE HUMAN CHORIONIC GONADOTROPIN (HCG) IN EARLY PREGNANCY (HHS-HCC): ICD-10-CM

## 2023-11-28 LAB
ALBUMIN SERPL BCP-MCNC: 4.3 G/DL (ref 3.4–5)
ALP SERPL-CCNC: 59 U/L (ref 33–110)
ALT SERPL W P-5'-P-CCNC: 52 U/L (ref 7–45)
ANION GAP SERPL CALC-SCNC: 10 MMOL/L (ref 10–20)
AST SERPL W P-5'-P-CCNC: 30 U/L (ref 9–39)
B-HCG SERPL-ACNC: 3 MIU/ML
BILIRUB SERPL-MCNC: 0.5 MG/DL (ref 0–1.2)
BUN SERPL-MCNC: 9 MG/DL (ref 6–23)
CALCIUM SERPL-MCNC: 9.2 MG/DL (ref 8.6–10.3)
CHLORIDE SERPL-SCNC: 101 MMOL/L (ref 98–107)
CO2 SERPL-SCNC: 30 MMOL/L (ref 21–32)
CREAT SERPL-MCNC: 0.72 MG/DL (ref 0.5–1.05)
ERYTHROCYTE [DISTWIDTH] IN BLOOD BY AUTOMATED COUNT: 13.9 % (ref 11.5–14.5)
GFR SERPL CREATININE-BSD FRML MDRD: >90 ML/MIN/1.73M*2
GLUCOSE SERPL-MCNC: 71 MG/DL (ref 74–99)
HCT VFR BLD AUTO: 37.2 % (ref 36–46)
HGB BLD-MCNC: 12 G/DL (ref 12–16)
MCH RBC QN AUTO: 27.3 PG (ref 26–34)
MCHC RBC AUTO-ENTMCNC: 32.3 G/DL (ref 32–36)
MCV RBC AUTO: 85 FL (ref 80–100)
NRBC BLD-RTO: 0 /100 WBCS (ref 0–0)
PLATELET # BLD AUTO: 313 X10*3/UL (ref 150–450)
POTASSIUM SERPL-SCNC: 4 MMOL/L (ref 3.5–5.3)
PROT SERPL-MCNC: 7.1 G/DL (ref 6.4–8.2)
RBC # BLD AUTO: 4.39 X10*6/UL (ref 4–5.2)
SODIUM SERPL-SCNC: 137 MMOL/L (ref 136–145)
WBC # BLD AUTO: 8.2 X10*3/UL (ref 4.4–11.3)

## 2023-11-28 PROCEDURE — 84702 CHORIONIC GONADOTROPIN TEST: CPT

## 2023-11-28 PROCEDURE — 36415 COLL VENOUS BLD VENIPUNCTURE: CPT

## 2023-11-28 PROCEDURE — 85027 COMPLETE CBC AUTOMATED: CPT

## 2023-11-28 PROCEDURE — 80053 COMPREHEN METABOLIC PANEL: CPT

## 2023-11-28 NOTE — PROGRESS NOTES
Plan:   Pregnancy of unknown location, hcg from today trending down; suspect biochemical pregnancy that is resolving, pt will repeat hcg in one week   Advised to call with bleeding, pain or concerns.     Ultrasound results and Plan of care reviewed with MD Radha Asher  11/22/2023  2:03 PM    For repeat bhCG today.    11/28/23 at 6:44 AM - Abby Crawford RN    TC to pt - she understood that she was to have bhCG one week after seeing Radha, which is tomorrow (c/w Radha's note). Pt will go after work today. Denies any c/o pain.  Will place in huddle for tomorrow.    11/28/23 at 11:34 AM - Abby Crawford RN

## 2023-11-29 ENCOUNTER — APPOINTMENT (OUTPATIENT)
Dept: ENDOCRINOLOGY | Facility: CLINIC | Age: 23
End: 2023-11-29
Payer: COMMERCIAL

## 2023-11-29 DIAGNOSIS — N96 RECURRENT PREGNANCY LOSS WITHOUT CURRENT PREGNANCY: Primary | ICD-10-CM

## 2023-11-29 NOTE — PROGRESS NOTES
Plan:   Pregnancy of unknown location, hcg from today trending down; suspect biochemical pregnancy that is resolving, pt will repeat hcg in one week   Advised to call with bleeding, pain or concerns.     Ultrasound results and Plan of care reviewed with MD Radha Asher  11/22/2023  2:03 PM          S/P IUI 10/30/2023.   BhCG from yesterday, 11/29 = 3.  Will call pt with results and plan after meeting.    11/29/23 at 6:44 AM - Abby Crawford RN          HCG, Beta-Quantitative   Date Value Ref Range Status   11/28/2023 3 <5 mIU/mL Final   11/22/2023 34 (H) <5 mIU/mL Final     Comment:     Low-level positive HCG results can be seen in early pregnancy, in ayden- or post-menopausal females due to normal pituitary HCG production, or with analytic interference. Repeat testing in 48-72 hours can aid in assessing for pregnancy as results should double in this time period. FSH measurement is recommended in ayden- or post-menopausal females as concurrent elevation of FSH can support pituitary production as the source of the HCG elevation.      11/21/2023 71 (H) <5 mIU/mL Final     Comment:     Low-level positive HCG results can be seen in early pregnancy, in ayden- or post-menopausal females due to normal pituitary HCG production, or with analytic interference. Repeat testing in 48-72 hours can aid in assessing for pregnancy as results should double in this time period. FSH measurement is recommended in ayden- or post-menopausal females as concurrent elevation of FSH can support pituitary production as the source of the HCG elevation.        [facility] PROVIDER NOTE  Ultrasound and/or labs reviewed at [facility].   Results for orders placed or performed in visit on 11/28/23 (from the past 96 hour(s))   CBC   Result Value Ref Range    WBC 8.2 4.4 - 11.3 x10*3/uL    nRBC 0.0 0.0 - 0.0 /100 WBCs    RBC 4.39 4.00 - 5.20 x10*6/uL    Hemoglobin 12.0 12.0 - 16.0 g/dL    Hematocrit 37.2 36.0 - 46.0 %    MCV 85 80 -  100 fL    MCH 27.3 26.0 - 34.0 pg    MCHC 32.3 32.0 - 36.0 g/dL    RDW 13.9 11.5 - 14.5 %    Platelets 313 150 - 450 x10*3/uL   Comprehensive metabolic panel   Result Value Ref Range    Glucose 71 (L) 74 - 99 mg/dL    Sodium 137 136 - 145 mmol/L    Potassium 4.0 3.5 - 5.3 mmol/L    Chloride 101 98 - 107 mmol/L    Bicarbonate 30 21 - 32 mmol/L    Anion Gap 10 10 - 20 mmol/L    Urea Nitrogen 9 6 - 23 mg/dL    Creatinine 0.72 0.50 - 1.05 mg/dL    eGFR >90 >60 mL/min/1.73m*2    Calcium 9.2 8.6 - 10.3 mg/dL    Albumin 4.3 3.4 - 5.0 g/dL    Alkaline Phosphatase 59 33 - 110 U/L    Total Protein 7.1 6.4 - 8.2 g/dL    AST 30 9 - 39 U/L    Bilirubin, Total 0.5 0.0 - 1.2 mg/dL    ALT 52 (H) 7 - 45 U/L   (Lab Collect) Human Chorionic Gonadotropin, Serum Quantitative   Result Value Ref Range    HCG, Beta-Quantitative 3 <5 mIU/mL     Ok to stop HCG testing now that negative. Can resume IUI with next menses. This is her third pregnancy loss. Chart to Radha Smith for poss + LAC on prior RPL testing.   Dina Lara    TC to pt to review negative bhCG result; pt can stop further testing.  Nurse will send note to Radha to follow up with pt to review RPL labs and next step.  Confirmed with pt that this is her second pregnancy loss.  (July 2023 - 7 week SAB; this biochemical).     11/29/23 at 1:27 PM - Abby Crawford RN

## 2023-12-05 ENCOUNTER — TELEPHONE (OUTPATIENT)
Dept: ENDOCRINOLOGY | Facility: CLINIC | Age: 23
End: 2023-12-05
Payer: COMMERCIAL

## 2023-12-05 NOTE — TELEPHONE ENCOUNTER
----- Message from SAÚL Folwers-CNP sent at 11/29/2023  9:55 PM EST -----  Regarding: RE: Please call pt - follow up on biochemical per RF  We already had a visit for RPL. The LAC was done when she still had + HCG she needs to repeat this now that HCG is negative. Thank you  ----- Message -----  From: Abby Crawford RN  Sent: 11/29/2023   1:30 PM EST  To: ZAFAR Flowers  Subject: Please call pt - follow up on biochemical pe#    HI - see note from meeting today. Thanks!    HCG testing now that negative. Can resume IUI with next menses. This is her third pregnancy loss. Chart to Radha Smith for poss + LAC on prior RPL testing.   Dina Lara    TC to pt to review negative bhCG result; pt can stop further testing.  Nurse will send note to Radha to follow up with pt to review RPL labs and next step.  Confirmed with pt that this is her second pregnancy loss.  (July 2023 - 7 week SAB; this biochemical).     11/29/23 at 1:27 PM - Abby Crawford RN    TC to pt to let her know that LAC needs to be repeated as she now has negative bhCG.  Order is in the system. Pt agreeable to go and once resulted, Radha will review plan.     12/05/23 at 10:14 AM - Abby Crawford RN

## 2023-12-06 ENCOUNTER — LAB (OUTPATIENT)
Dept: LAB | Facility: LAB | Age: 23
End: 2023-12-06
Payer: COMMERCIAL

## 2023-12-06 DIAGNOSIS — N97.9 FEMALE INFERTILITY: ICD-10-CM

## 2023-12-06 LAB — LH SERPL-ACNC: 5.8 IU/L

## 2023-12-06 PROCEDURE — 83002 ASSAY OF GONADOTROPIN (LH): CPT

## 2023-12-06 PROCEDURE — 36415 COLL VENOUS BLD VENIPUNCTURE: CPT

## 2023-12-21 ENCOUNTER — TELEPHONE (OUTPATIENT)
Dept: MATERNAL FETAL MEDICINE | Facility: CLINIC | Age: 23
End: 2023-12-21
Payer: COMMERCIAL

## 2023-12-21 ENCOUNTER — LAB (OUTPATIENT)
Dept: LAB | Facility: LAB | Age: 23
End: 2023-12-21
Payer: COMMERCIAL

## 2023-12-21 DIAGNOSIS — N97.9 FEMALE INFERTILITY: ICD-10-CM

## 2023-12-21 DIAGNOSIS — N91.2 AMENORRHEA: ICD-10-CM

## 2023-12-21 DIAGNOSIS — N91.2 AMENORRHEA: Primary | ICD-10-CM

## 2023-12-21 DIAGNOSIS — N96 RECURRENT PREGNANCY LOSS WITHOUT CURRENT PREGNANCY: ICD-10-CM

## 2023-12-21 LAB
B-HCG SERPL-ACNC: <2 MIU/ML
PROGEST SERPL-MCNC: 3.4 NG/ML

## 2023-12-21 PROCEDURE — 84702 CHORIONIC GONADOTROPIN TEST: CPT

## 2023-12-21 PROCEDURE — 84144 ASSAY OF PROGESTERONE: CPT

## 2023-12-21 PROCEDURE — 85730 THROMBOPLASTIN TIME PARTIAL: CPT

## 2023-12-21 PROCEDURE — 85613 RUSSELL VIPER VENOM DILUTED: CPT

## 2023-12-21 PROCEDURE — 36415 COLL VENOUS BLD VENIPUNCTURE: CPT

## 2023-12-21 NOTE — TELEPHONE ENCOUNTER
SONNY PT     PT is calling because she still has not gotten a cycle sense her mis in November. She is interested in medication to help jumpstart her cycle.

## 2023-12-21 NOTE — TELEPHONE ENCOUNTER
Called patient back. Patient had biochemical in November and trended to negative 11/28. Patient still has not gotten a period following that. Patient will get hcg and p4 drawn today to possibly get provera to induce a period. Patient has used provera in the past. Patient will also get lupus anticoag drawn that was ordered. Patient added to noon huddle for tomorrow. Cycle tab started but no IUI orders placed yet. Patient is planning IUI with this coming cycle.

## 2023-12-22 NOTE — PROGRESS NOTES
Patient had biochemical in November and trended to negative 11/28. Patient still has not gotten a period following that. Patient will get hcg and p4 drawn today (12/21) to possibly get provera to induce a period. Patient has used provera in the past. Patient will also get lupus anticoag drawn that was ordered.     Component      Latest Ref Rng 12/21/2023   HCG, Beta-Quantitative      <5 mIU/mL <2    Progesterone      ng/mL 3.4      TC to pt; reviewed above labs; LMP was 11/20;  P4 likely on the way down, and if so, should get period within the next week. Pt to call with period to set up Let/Clomid/ US/ trigger IUI cycle; to call in a week if no period.     12/22/23 at 10:16 AM - Abby Crawford RN    Call to patient: Monitoring today, LMP 11-, labs today: HCG < 2 & P4 ovulatory at 3.4- patient to call with menses for a Let/Clomid/ US/ trigger IUI cycle. Plan to be communicated by RN. Plan agreed upon by Dr. Suárez.   12/22/23 at 12:56 PM - SAÚL Chun-CNP

## 2023-12-26 ENCOUNTER — DOCUMENTATION (OUTPATIENT)
Dept: ENDOCRINOLOGY | Facility: CLINIC | Age: 23
End: 2023-12-26

## 2023-12-26 ENCOUNTER — TELEPHONE (OUTPATIENT)
Dept: ENDOCRINOLOGY | Facility: CLINIC | Age: 23
End: 2023-12-26
Payer: COMMERCIAL

## 2023-12-26 DIAGNOSIS — N97.9 FEMALE INFERTILITY: ICD-10-CM

## 2023-12-26 LAB
DRVVT SCREEN TO CONFIRM RATIO: 1.26 RATIO
DRVVT/DRVVT CFM NRMLZD PPP-RTO: 1.08 RATIO
DRVVT/DRVVT CFM P DOAC NEUT NORM PPP-RTO: 1.17 RATIO
LA 2 SCREEN W REFLEX-IMP: NORMAL
NORMALIZED SCT PPP-RTO: 1.12 RATIO
SILICA CLOTTING TIME CONFIRMATION: 1.1 RATIO
SILICA CLOTTING TIME SCREEN: 1.23 RATIO

## 2023-12-26 RX ORDER — LETROZOLE 2.5 MG/1
5 TABLET, FILM COATED ORAL DAILY
Qty: 10 TABLET | Refills: 0 | Status: SHIPPED | OUTPATIENT
Start: 2023-12-26 | End: 2024-01-26 | Stop reason: ALTCHOICE

## 2023-12-26 RX ORDER — PROGESTERONE 100 MG/1
100 CAPSULE ORAL 2 TIMES DAILY
Qty: 60 CAPSULE | Refills: 1 | Status: SHIPPED | OUTPATIENT
Start: 2023-12-26 | End: 2024-02-12 | Stop reason: WASHOUT

## 2023-12-26 RX ORDER — LETROZOLE 2.5 MG/1
5 TABLET, FILM COATED ORAL DAILY
Qty: 10 TABLET | Refills: 0 | Status: SHIPPED | OUTPATIENT
Start: 2023-12-26 | End: 2023-12-26 | Stop reason: SDUPTHER

## 2023-12-26 RX ORDER — CLOMIPHENE CITRATE 50 MG/1
100 TABLET ORAL DAILY
Qty: 10 TABLET | Refills: 0 | Status: SHIPPED | OUTPATIENT
Start: 2023-12-26 | End: 2024-01-05

## 2023-12-26 RX ORDER — CLOMIPHENE CITRATE 50 MG/1
100 TABLET ORAL DAILY
Qty: 10 TABLET | Refills: 0 | Status: SHIPPED | OUTPATIENT
Start: 2023-12-26 | End: 2023-12-26 | Stop reason: SDUPTHER

## 2023-12-26 RX ORDER — CHORIONIC GONADOTROPIN 10000 UNIT
10000 KIT INTRAMUSCULAR ONCE AS NEEDED
Qty: 1 EACH | Refills: 0 | Status: SHIPPED | OUTPATIENT
Start: 2023-12-26 | End: 2024-03-22 | Stop reason: WASHOUT

## 2023-12-26 NOTE — TELEPHONE ENCOUNTER
Called patient back. LMP 12/24. Patient will be doing second IUI with monitoring trigger (Clomid and letrozole). Med orders signed off. Patient can start after negative UPT. Will call patient again tomorrow 12/27 to schedule monitoring appointment- left for the day.   KARI CANCINO on 12/26/23 at 4:38 PM.    Called patient back to get her scheduled for CD 11 monitoring. All orders are in and patient has no current questions. Patient is starting her letrozole and clomid today CD 4 after neg upt. Patient transferred to  to schedule monitoring appointment for IUI.   AKRI CANCINO on 12/27/23 at 9:43 AM.

## 2023-12-26 NOTE — PROGRESS NOTES
Clomid and Letrozole re-routed to patient's preferred retail pharmacy ([facility]) per Zenobia Herr.    Estela Alvarado, GeorgeD

## 2023-12-27 PROCEDURE — RXMED WILLOW AMBULATORY MEDICATION CHARGE

## 2024-01-03 ENCOUNTER — ANCILLARY PROCEDURE (OUTPATIENT)
Dept: ENDOCRINOLOGY | Facility: CLINIC | Age: 24
End: 2024-01-03
Payer: COMMERCIAL

## 2024-01-03 DIAGNOSIS — N97.9 FEMALE INFERTILITY: ICD-10-CM

## 2024-01-03 LAB
ESTRADIOL SERPL-MCNC: 127 PG/ML
LH SERPL-ACNC: 12.3 IU/L

## 2024-01-03 PROCEDURE — 76857 US EXAM PELVIC LIMITED: CPT

## 2024-01-03 PROCEDURE — 83002 ASSAY OF GONADOTROPIN (LH): CPT

## 2024-01-03 PROCEDURE — 82670 ASSAY OF TOTAL ESTRADIOL: CPT

## 2024-01-03 PROCEDURE — 36415 COLL VENOUS BLD VENIPUNCTURE: CPT

## 2024-01-05 ENCOUNTER — SPECIALTY PHARMACY (OUTPATIENT)
Dept: PHARMACY | Facility: CLINIC | Age: 24
End: 2024-01-05

## 2024-01-05 ENCOUNTER — PHARMACY VISIT (OUTPATIENT)
Dept: PHARMACY | Facility: CLINIC | Age: 24
End: 2024-01-05
Payer: COMMERCIAL

## 2024-01-08 ENCOUNTER — PROCEDURE VISIT (OUTPATIENT)
Dept: ENDOCRINOLOGY | Facility: CLINIC | Age: 24
End: 2024-01-08
Payer: COMMERCIAL

## 2024-01-08 DIAGNOSIS — Z31.89 ENCOUNTER FOR ARTIFICIAL INSEMINATION: ICD-10-CM

## 2024-01-08 PROCEDURE — 58322 ARTIFICIAL INSEMINATION: CPT | Performed by: NURSE PRACTITIONER

## 2024-01-08 PROCEDURE — 89261 SPERM ISOLATION COMPLEX: CPT | Performed by: OBSTETRICS & GYNECOLOGY

## 2024-01-08 NOTE — PROGRESS NOTES
"Patient ID: Katy Roldan is a 23 y.o. female.    Intrauterine Insemination (IUI)    Date/Time: 1/8/2024 9:27 AM    Performed by: ZAFAR Orozco  Authorized by: ZAFAR Chun    Consent:     Consent obtained:  Verbal and written    Consent given by:  Patient    Procedure risks and benefits discussed: yes      Patient questions answered: yes      Patient agrees, verbalizes understanding, and wants to proceed: yes      Educational handouts given: yes      Instructions and paperwork completed: yes    Procedure:     Pelvic exam performed: yes      Speculum placed in vagina: yes      Tenaculum applied to cervix: no      Type of insemination: intrauterine insemination      Ultrasound guidance: No      Speculum type: Octavia      Catheter type: curved      Curvature: mild      Difficulty: easy      Estimated Blood Loss:  None    Specimen Return: none    Post-procedure:     Patient tolerated procedure well: yes      Post-procedure plan: patient counseled on signs and symptoms for which to call and/or return to clinic    Comments:     Procedure comments:  IUI Procedure note:    The patient presented today for IUI.     Consent signed by patient, IUI sample identified by patient, and Final Verification performed with patient via electronic system \"\" prior to insemination.   All patient's questions were discussed and answered.          Chaperone offered to patient for intimate exam: Patient declined chaperone  Name of chaperone: N/A    Specimen Source: Partner  Specimen Condition: Fresh  TMS 11.1    Additional notes:    Patient was advised to call office if she develops fever, chills, pelvic pain, or heavy bleeding.    Progesterone and post-IUI teaching completed. Will start Progesterone three days after IUI and continue twice daily. Pt will do a home pregnancy test two weeks from IUI date. If home pregnancy test positive, patient will continue Progesterone and call office to schedule BHCG. If home " pregnancy test negative, patient will discontinue Progesterone, and was advised to call office with start of menses; will proceed with another cycle if appropriate.  Patient verbalized understanding of plan.    KELLI AVILES on 1/8/24 at 9:28 AM.

## 2024-01-16 NOTE — PROGRESS NOTES
CYCLING NOTE    Here for US and/or lab monitoring; relevant findings reviewed.    Patient stayed for nurse visit. Pain is 0/10  Team will contact patient later today with results and plan. Patient has trigger shot at home.     Component      Latest Ref Rng 1/3/2024   LH      IU/L 12.3    Estradiol      pg/mL 127          Kari Herr  01/03/2024  9:03 AM    MD note: reviewed cycling patient. Plan to trigger with pregnyl on 1/6 and plan on scheduling IUI for 1/8.  Etelvina Barrios  01/03/2024  11:58 AM    Called patient left voicemail with plan. Patient will trigger on 1/6 for an IUI on 1/8. Patient to call the  to get scheduled for her IUI.   KARI HERR on 1/3/24 at 1:17 PM.    Did not receive a call back from the patient, sent [employer] message with plan and patient to call back and schedule IUI for 1/8.   KARI HERR on 1/3/24 at 4:33 PM.             Yes. Orders signed. Please let mom know. Thanks.

## 2024-01-22 ENCOUNTER — LAB (OUTPATIENT)
Dept: LAB | Facility: LAB | Age: 24
End: 2024-01-22
Payer: COMMERCIAL

## 2024-01-22 ENCOUNTER — TELEPHONE (OUTPATIENT)
Dept: ENDOCRINOLOGY | Facility: CLINIC | Age: 24
End: 2024-01-22
Payer: COMMERCIAL

## 2024-01-22 DIAGNOSIS — Z32.01 POSITIVE BLOOD PREGNANCY TEST (HHS-HCC): ICD-10-CM

## 2024-01-22 DIAGNOSIS — Z32.01 POSITIVE BLOOD PREGNANCY TEST (HHS-HCC): Primary | ICD-10-CM

## 2024-01-22 LAB — B-HCG SERPL-ACNC: 151 MIU/ML

## 2024-01-22 PROCEDURE — 36415 COLL VENOUS BLD VENIPUNCTURE: CPT

## 2024-01-22 PROCEDURE — 84702 CHORIONIC GONADOTROPIN TEST: CPT

## 2024-01-22 NOTE — TELEPHONE ENCOUNTER
Initial HCG: Patient called with + HPT  Patient's [facility] Provider: Radha Smith CNP  Infertility dx: Polycystic Ovarian Syndrome  Achieved pregnancy by: IUI  Fertility medications used this cycle: Clomid, letrozole, luteal progesterone, and trigger   LMP: Patient's last menstrual period was 23  EGA based on (IUI date, ET ): IUI date 4W0D    Updated G/P with this pregnancy:  (if pregnant)   OB HX:  OB History    Para Term  AB Living   2       2     SAB IAB Ectopic Multiple Live Births   2              # Outcome Date GA Lbr Lewis/2nd Weight Sex Delivery Anes PTL Lv   2 SAB 23     Biochemical      1 SAB                Type & Screen: No results found for requested labs within last 365 days.  History of miscarriage: Yes, 2  History of pelvic/abdominal surgeries: No  History of STDs/PID: No  Hx of ectopic: No  Hx of tubal disease/ blockage: No    Risk for ectopic: No      Current medications:     Current Outpatient Medications:     albuterol 90 mcg/actuation inhaler, Inhale 2 puffs every 4 hours if needed., Disp: , Rfl:     cholecalciferol (Vitamin D-3) 50 mcg (2,000 unit) capsule, TAKE 2 CAPSULE, Disp: , Rfl:     chorionic gonadotropin (Pregnyl) 10,000 unit injection, Reconstitute according to instructions and inject 10,000 units (1 mL) under the skin as a one time dose, as directed per provider for trigger., Disp: 1 each, Rfl: 0    clobetasol (Temovate) 0.05 % external solution, APPLY TO THE AFFECTED AREA(S) TWICE DAILY, Disp: , Rfl:     fexofenadine HCl (ALLEGRA ORAL), Take by mouth once daily., Disp: , Rfl:     fluocinolone (Synalar) 0.01 % external solution, as directed apply to scalp every night at bed time Externally 30 days, Disp: , Rfl:     fluticasone (Flonase) 50 mcg/actuation nasal spray, Administer 2 sprays into each nostril once daily., Disp: , Rfl:     ketoconazole (NIZOral) 2 % cream, Apply to affected area twice daily as needed (rash in skin fold)., Disp: , Rfl:      ketoconazole (NIZOral) 2 % shampoo, as directed wash scalp daily in the shower, leave sit 3-5 minutes then rinse Externally 30 days, Disp: , Rfl:     letrozole (Femara) 2.5 mg tablet, Take 2 tablets (5 mg total) by mouth once daily.  Take 2 tablets by mouth cycle days 3-7 after negative urine pregnancy test every cycle before starting letrozole., Disp: 10 tablet, Rfl: 0    levothyroxine (Synthroid, Levoxyl) 112 mcg tablet, Take 1 tablet by mouth once daily. Plus an extra tablet twice a week (total 9 tablets per week), Disp: , Rfl:     levothyroxine (Synthroid, Levoxyl) 125 mcg tablet, Take 1 tablet (125 mcg) by mouth once daily., Disp: , Rfl:     levothyroxine (Synthroid, Levoxyl) 50 mcg tablet, TAKE TWO TABLETS BY MOUTH MONDAY, WEDNESDAY, FRIDAY, SATURDAY AND SUNDAYS, AND TAKE ONE TABLET BY MOUTH ONE TUESDAY AND THURSDAY, Disp: , Rfl:     levothyroxine (Synthroid, Levoxyl) 75 mcg tablet, Take 1 tablet (75 mcg) by mouth once daily., Disp: , Rfl:     levothyroxine (Synthroid, Levoxyl) 88 mcg tablet, Take 1 tablet (88 mcg) by mouth once daily., Disp: , Rfl:     medroxyPROGESTERone (Provera) 10 mg tablet, Take 1 tablet (10 mg) by mouth once daily., Disp: , Rfl:     metFORMIN  mg 24 hr tablet, Take 1 tablet (500 mg) by mouth 2 times a day with meals., Disp: , Rfl:     nitrofurantoin, macrocrystal-monohydrate, (Macrobid) 100 mg capsule, Take 1 capsule (100 mg) by mouth twice a day., Disp: , Rfl:     norethindrone-ethin estradioL (Alyacen 1/35, 28,) 1-35 mg-mcg tablet, Take 1 tablet by mouth once daily. continuously, Disp: , Rfl:     norethindrone-ethin estradioL (Necon) 0.5-35 mg-mcg tablet, Take 1 tablet by mouth once daily., Disp: , Rfl:     prenatal vitamin, iron-folic, (Prenatal Vitamin) 27 mg iron-800 mcg folic acid tablet, Take 1 tablet by mouth once daily., Disp: , Rfl:     progesterone (Prometrium) 100 mg capsule, Insert 1 capsule (100 mg) into the vagina 2 times a day. Starting 3 days after IUI, Disp: 60  capsule, Rfl: 1    sertraline (Zoloft) 100 mg tablet, Take 1 tablet (100 mg) by mouth once daily., Disp: , Rfl:     sertraline (Zoloft) 50 mg tablet, Take 1 tablet (50 mg) by mouth once daily. as directed, Disp: , Rfl:     venlafaxine XR (Effexor-XR) 75 mg 24 hr capsule, Take 1 capsule (75 mg) by mouth once daily., Disp: , Rfl:     PNV: Yes  Vitamin D 2000 IUs: No  Luteal support: Prometrium 100 mg PV BID  Additional medications: Levothyroxine 125 mcg daily plus two extra pills weekly (will need a repeat thyroid level if pregnant?), metformin, zoloft 50mg,     Labs ordered/Plan:   [facility] ordered. Team will reach out to patient with results and plan.   Discussed early pregnancy versus miscarriage versus ectopic. Precautions given.   Patient expresses understanding of plan and is agreeable.      Patient is going to go for lab draw this afternoon and will be added to noon huddle for tomorrow 1/23. Will call patient with results and plan. Order pended for HCG.   KARI CANCINO on 1/22/24 at 9:46 AM.

## 2024-01-23 DIAGNOSIS — N97.9 FEMALE INFERTILITY: ICD-10-CM

## 2024-01-23 RX ORDER — PROGESTERONE 200 MG/1
200 CAPSULE ORAL 2 TIMES DAILY
Qty: 60 CAPSULE | Refills: 2 | Status: SHIPPED | OUTPATIENT
Start: 2024-01-23 | End: 2024-03-22 | Stop reason: WASHOUT

## 2024-01-23 NOTE — PROGRESS NOTES
Initial HCG: Patient called with + HPT  Patient's [facility] Provider: Radha Smith CNP  Infertility dx: Polycystic Ovarian Syndrome  Achieved pregnancy by: IUI 2024  Fertility medications used this cycle: Clomid, letrozole, luteal progesterone, and trigger   LMP: Patient's last menstrual period was 23  EGA based on (IUI date, ET ): IUI date 4W0D     Updated G/P with this pregnancy:  (if pregnant)   OB HX:                   OB History    Para Term  AB Living   2       2     SAB IAB Ectopic Multiple Live Births      2                  # Outcome Date GA Lbr Lewis/2nd Weight Sex Delivery Anes PTL Lv   2 SAB 23         Biochemical         1 SAB                           Type & Screen: 2023  O+ Abscreen negative  History of miscarriage: Yes, 2  History of pelvic/abdominal surgeries: No  History of STDs/PID: No  Hx of ectopic: No  Hx of tubal disease/ blockage: No     Risk for ectopic: No  PNV: Yes  Vitamin D 2000 IUs: No  Luteal support: Prometrium 100 mg PV BID  Additional medications: Levothyroxine 125 mcg daily plus two extra pills weekly (will need a repeat thyroid level if pregnant?), metformin, zoloft 50mg,     Team to call pt today with results and plan.   24 at 6:51 AM - Abby Crawford RN       Component      Latest Ref Rng 2024   HCG, Beta-Quantitative      <5 mIU/mL 151 (H)       TC to pt; reviewed hCG level;  Pt is on current meds as above; asking if we should   increase Prometrium dose?  Add ASA?  Placed in huddle for  for repeat hCG and TSH level.  Will message pt after meeting to confirm plan.     24 at 12:07 PM - Abby Crawford RN MD note: reviewed HCG level. Plan to repeat on 24. Plan to increase prometrium to 200mg PV BID and start baby aspirin 81mg po daily. RN to communicate.  Etelvina KODAK Denis  12:27 PM  2024    Will send My Chart message re: above plan.     24 at 1:54 PM - Abby Crawford RN

## 2024-01-24 DIAGNOSIS — Z32.01 POSITIVE BLOOD PREGNANCY TEST (HHS-HCC): Primary | ICD-10-CM

## 2024-01-26 ENCOUNTER — LAB (OUTPATIENT)
Dept: LAB | Facility: LAB | Age: 24
End: 2024-01-26
Payer: COMMERCIAL

## 2024-01-26 DIAGNOSIS — Z32.01 PREGNANCY CONFIRMED BY POSITIVE BLOOD TEST (HHS-HCC): Primary | ICD-10-CM

## 2024-01-26 DIAGNOSIS — Z32.01 POSITIVE BLOOD PREGNANCY TEST (HHS-HCC): ICD-10-CM

## 2024-01-26 DIAGNOSIS — E03.8 OTHER SPECIFIED HYPOTHYROIDISM: ICD-10-CM

## 2024-01-26 DIAGNOSIS — E03.8 OTHER SPECIFIED HYPOTHYROIDISM: Primary | ICD-10-CM

## 2024-01-26 LAB
B-HCG SERPL-ACNC: 488 MIU/ML
T4 FREE SERPL-MCNC: 1.01 NG/DL (ref 0.61–1.12)
TSH SERPL-ACNC: 5.06 MIU/L (ref 0.44–3.98)

## 2024-01-26 PROCEDURE — 84443 ASSAY THYROID STIM HORMONE: CPT

## 2024-01-26 PROCEDURE — 84439 ASSAY OF FREE THYROXINE: CPT

## 2024-01-26 PROCEDURE — 36415 COLL VENOUS BLD VENIPUNCTURE: CPT

## 2024-01-26 PROCEDURE — 84702 CHORIONIC GONADOTROPIN TEST: CPT

## 2024-01-26 RX ORDER — LEVOTHYROXINE SODIUM 137 UG/1
137 TABLET ORAL
Qty: 30 TABLET | Refills: 1 | Status: SHIPPED | OUTPATIENT
Start: 2024-01-26 | End: 2024-02-19 | Stop reason: SDUPTHER

## 2024-01-26 NOTE — PROGRESS NOTES
Call to patient: TSH Level today is elevated at 5.06, T4 is normal @ 1.01, she is currently taking Synthroid 112 mcg daily and takes 1 extra pill twice a week. She has an endocrinologist at [facility] that she will reach out to, but will increase her today to Synthroid 137 mcg p.o. daily and repeat TSH in 6 weeks, orders placed and script sent to local pharmacy. Reviewed to take on an empty stomach, avoid food/fluids 30-60 minutes after taking, & avoid vitamins for 4 hours.   01/26/24 at 5:36 PM - SAÚL Chun-CNP

## 2024-01-26 NOTE — PROGRESS NOTES
Repeat bhCG & TSH today.  Patient's [facility] Provider: Radha Smith CNP  Infertility dx: Polycystic Ovarian Syndrome  Achieved pregnancy by: IUI 2024  Fertility medications used this cycle: Clomid, letrozole, luteal progesterone, and trigger   LMP: Patient's last menstrual period was 23  EGA based on (IUI date, ET ): IUI date 4      Updated G/P with this pregnancy:    OB HX:                                  OB History    Para Term  AB Living   2       2     SAB IAB Ectopic Multiple Live Births         2                     # Outcome Date GA Lbr Lewis/2nd Weight Sex Delivery Anes PTL Lv   2 SAB 23         Biochemical         1 SAB                           Type & Screen: 2023  O+ Ab screen negative  History of miscarriage: Yes, 2  History of pelvic/abdominal surgeries: No  History of STDs/PID: No  Hx of ectopic: No  Hx of tubal disease/ blockage: No     Risk for ectopic: No  PNV: Yes  Vitamin D 2000 IUs: No  Luteal support: Prometrium 200 mg PV BID  Additional medications: Levothyroxine 125 mcg daily plus two extra pills weekly (will need a repeat thyroid level if pregnant?), metformin, zoloft 50mg, ASA 81 mg/day     Team to call pt today with results and plan.   TC to pt:    Spoke to pt; reviewed today test results; pt to continue current meds and repeat on ; denies any bleeding; is having mild menstrual like cramps in the evenings; ectopic warnings reviewed;       [facility] PROVIDER NOTE - HCG REVIEW  Ultrasound and/or labs reviewed at [facility].     Results for orders placed or performed in visit on 24 (from the past 96 hour(s))   (Lab Collect) Human Chorionic Gonadotropin, Serum Quantitative   Result Value Ref Range    HCG, Beta-Quantitative 488 (H) <5 mIU/mL       Lab Results   Component Value Date    HCGQUANT 488 (H) 2024    HCGQUANT 151 (H) 2024       RTC in four days for LABS ONLY VISIT and HCG, TSH.   González Suárez  2024  1:09  PM    Spoke to pt; reviewed today test results; pt to continue current meds and repeat on Tuesday 1/30; denies any bleeding; is having mild menstrual like cramps in the evenings; ectopic warnings reviewed;   PT will go to  [facility]30 [facility] the AM[facility]    01/26/24 at 1:28 PM - Abby Crawford RN

## 2024-01-30 ENCOUNTER — LAB (OUTPATIENT)
Dept: LAB | Facility: LAB | Age: 24
End: 2024-01-30
Payer: COMMERCIAL

## 2024-01-30 DIAGNOSIS — O36.80X0 ENCOUNTER TO DETERMINE FETAL VIABILITY OF PREGNANCY, NOT APPLICABLE OR UNSPECIFIED FETUS (HHS-HCC): ICD-10-CM

## 2024-01-30 DIAGNOSIS — Z32.01 PREGNANCY CONFIRMED BY POSITIVE BLOOD TEST (HHS-HCC): ICD-10-CM

## 2024-01-30 LAB — B-HCG SERPL-ACNC: 3230 MIU/ML

## 2024-01-30 PROCEDURE — 36415 COLL VENOUS BLD VENIPUNCTURE: CPT

## 2024-01-30 PROCEDURE — 84702 CHORIONIC GONADOTROPIN TEST: CPT

## 2024-01-30 NOTE — PROGRESS NOTES
[facility]    Patient's [facility] Provider: Radha Smith CNP  Infertility dx: Polycystic Ovarian Syndrome  Achieved pregnancy by: IUI 2024  Fertility medications used this cycle: Clomid, letrozole, luteal progesterone, and trigger   LMP: Patient's last menstrual period was 23  EGA based on (IUI date, ET ): IUI date-  5     Updated G/P with this pregnancy:      Risk for ectopic: No  PNV: Yes  Vitamin D 2000 IUs: No  Luteal support: Prometrium 200 mg PV BID  Additional medications:  metformin, zoloft 50mg, ASA 81 mg/day  Note re: TSH Level  on   per Cari Whitten CNP:  Elevated at 5.06, T4 is normal @ 1.01, she is currently taking Synthroid 112 mcg daily and takes 1 extra pill twice a week. She has an endocrinologist at [facility] that she will reach out to, but will increase her today to Synthroid 137 mcg p.o. daily and repeat TSH in 6 weeks      Team to call pt today with results and plan.       24 at 7:01 AM - Abby Crawford RN    Component      Latest Ref Rng 2024   HCG, Beta-Quantitative      <5 mIU/mL 151 (H)  488 (H)  3,230 (H)       [facility] PROVIDER NOTE - HCG REVIEW  Ultrasound and/or labs reviewed at [facility].     Results for orders placed or performed in visit on 24 (from the past 96 hour(s))   (Lab Collect) Human Chorionic Gonadotropin, Serum Quantitative   Result Value Ref Range    HCG, Beta-Quantitative 3,230 (H) <5 mIU/mL       Lab Results   Component Value Date    HCGQUANT 3,230 (H) 2024    HCGQUANT 488 (H) 2024    HCGQUANT 151 (H) 2024    HCGQUANT <2 2023    HCGQUANT 3 2023    HCGQUANT 34 (H) 2023    HCGQUANT 71 (H) 2023    HCGQUANT 86 (H) 2023       RTC in AT APPROX 6 weeks 5 DAYS GESTATION for OB scan and  no labs .   Susi Ceja  2024  1:52 PM    TC to pt; reviewed hCG level from today and plan for OB US; PT will call [place] tomorrow to schedule for ; Pt to remain on current  meds; she has followed up with her F endocrinologist who will repeat her TSH in 4 weeks. Pt will plan to call [facility] for OB care.    All questions answered.    01/30/24 at 3:01 PM - Abby Crawford RN

## 2024-02-12 ENCOUNTER — OFFICE VISIT (OUTPATIENT)
Dept: ENDOCRINOLOGY | Facility: CLINIC | Age: 24
End: 2024-02-12
Payer: COMMERCIAL

## 2024-02-12 ENCOUNTER — ANCILLARY PROCEDURE (OUTPATIENT)
Dept: ENDOCRINOLOGY | Facility: CLINIC | Age: 24
End: 2024-02-12
Payer: COMMERCIAL

## 2024-02-12 DIAGNOSIS — O36.80X0 ENCOUNTER TO DETERMINE FETAL VIABILITY OF PREGNANCY, SINGLE OR UNSPECIFIED FETUS (HHS-HCC): Primary | ICD-10-CM

## 2024-02-12 DIAGNOSIS — O36.80X0 ENCOUNTER TO DETERMINE FETAL VIABILITY OF PREGNANCY, NOT APPLICABLE OR UNSPECIFIED FETUS (HHS-HCC): ICD-10-CM

## 2024-02-12 PROCEDURE — 99213 OFFICE O/P EST LOW 20 MIN: CPT | Performed by: NURSE PRACTITIONER

## 2024-02-12 PROCEDURE — 76817 TRANSVAGINAL US OBSTETRIC: CPT

## 2024-02-12 NOTE — PROGRESS NOTES
In Person    Pt is a 23 year old  female currently pregnant from Clomid/Letrozole combo partner IUI with hx of PCOS, RPL, and infertility presenting for ob ultrasound.     OB Scan    Ectopic risk factor: no  PGTA/PGTM: no  Blood type: O+ antibody  negative     Reviewed results from OB ultrasound today and results reviewed with pt as follows:     OB Scan results:   Conception 2024 Conception: IUI 7 w + 0 d 2024  U/S 2024 based upon CRL 7 w + 0 d 2024  Assigned dating based on the conception date (IUI), selected on 2024  7 w + 0 d 2024  Assessment Gestational sac: visualized. Location: intrauterine  Yolk sac: visualized  Embryo: visualized  Cardiac activity: present  Early placenta: circumferential  YS 3.4 mm 6w 0d 20% Papaioannou  CRL 7.7 mm 7w 0d 44% Pexsters   bpm    Detailed discussion with patient about her scan results, pregnancy, and next steps:    Plan:   Reviewed medications in detail. She will continue PNV.   Reviewed supplemental progesterone, route and dose, reviewed dates of cessation. discontinue at 10.6 wga  Continue low dose aspirin follow up with OBGYN on when to discontinue discussed benefit of low dose aspirin in reducing risk for pregnancy induced hypertension.  Discussed with patient any pregnancy concerns and discussed establishing care with an  OB.Pt has initial appointment with OBGYN within 2 weeks pt aware ok to transfer care   Advised to call with bleeding, pain or concerns.    Ultrasound results and Plan of care reviewed with MD Radha Alaniz  2024  1:40 PM

## 2024-02-19 ENCOUNTER — TELEPHONE (OUTPATIENT)
Dept: RADIOLOGY | Facility: CLINIC | Age: 24
End: 2024-02-19
Payer: COMMERCIAL

## 2024-02-19 DIAGNOSIS — N97.9 FEMALE INFERTILITY: ICD-10-CM

## 2024-02-19 DIAGNOSIS — E03.8 OTHER SPECIFIED HYPOTHYROIDISM: ICD-10-CM

## 2024-02-19 PROBLEM — Z34.90 PRENATAL CARE, ANTEPARTUM (HHS-HCC): Status: ACTIVE | Noted: 2024-02-19

## 2024-02-19 PROBLEM — E66.813 OBESITY, CLASS III, BMI 40-49.9 (MORBID OBESITY): Status: ACTIVE | Noted: 2024-02-19

## 2024-02-19 PROBLEM — E66.813 CLASS 3 SEVERE OBESITY WITH SERIOUS COMORBIDITY AND BODY MASS INDEX (BMI) OF 50.0 TO 59.9 IN ADULT: Status: RESOLVED | Noted: 2018-05-22 | Resolved: 2024-02-19

## 2024-02-19 PROBLEM — E28.2 PCOS (POLYCYSTIC OVARIAN SYNDROME): Status: RESOLVED | Noted: 2021-05-20 | Resolved: 2024-02-19

## 2024-02-19 PROBLEM — N91.5 OLIGOMENORRHEA: Status: RESOLVED | Noted: 2023-10-24 | Resolved: 2024-02-19

## 2024-02-19 PROBLEM — H52.203 MYOPIA OF BOTH EYES WITH ASTIGMATISM: Status: RESOLVED | Noted: 2021-03-22 | Resolved: 2024-02-19

## 2024-02-19 PROBLEM — E66.01 CLASS 3 SEVERE OBESITY WITH SERIOUS COMORBIDITY AND BODY MASS INDEX (BMI) OF 50.0 TO 59.9 IN ADULT (MULTI): Status: RESOLVED | Noted: 2018-05-22 | Resolved: 2024-02-19

## 2024-02-19 PROBLEM — O09.01 PREGNANCY WITH HISTORY OF INFERTILITY IN FIRST TRIMESTER (HHS-HCC): Status: ACTIVE | Noted: 2024-02-19

## 2024-02-19 PROBLEM — N97.0 FEMALE INFERTILITY ASSOCIATED WITH ANOVULATION: Status: RESOLVED | Noted: 2023-10-24 | Resolved: 2024-02-19

## 2024-02-19 PROBLEM — E66.01 OBESITY, CLASS III, BMI 40-49.9 (MORBID OBESITY) (MULTI): Status: ACTIVE | Noted: 2024-02-19

## 2024-02-19 PROBLEM — H52.13 MYOPIA OF BOTH EYES WITH ASTIGMATISM: Status: RESOLVED | Noted: 2021-03-22 | Resolved: 2024-02-19

## 2024-02-19 RX ORDER — LEVOTHYROXINE SODIUM 137 UG/1
137 TABLET ORAL
Qty: 90 TABLET | Refills: 1 | Status: SHIPPED | OUTPATIENT
Start: 2024-02-19 | End: 2024-02-21

## 2024-02-19 RX ORDER — LEVOTHYROXINE SODIUM 25 UG/1
25 TABLET ORAL DAILY
Qty: 30 TABLET | Refills: 11 | Status: CANCELLED | OUTPATIENT
Start: 2024-02-19 | End: 2025-02-18

## 2024-02-19 NOTE — TELEPHONE ENCOUNTER
SONNY PT     PT is calling because she needs a refill on her levothoroxin - Her ins requires a three month supply be put in

## 2024-02-19 NOTE — TELEPHONE ENCOUNTER
Returned patient's call, refill placed for synthroid 137 mcg. Patient will get repeat level drawn on March 4th. Message sent to  to add patient  to noon huddle.   KARI CANCINO on 2/19/24 at 10:21 AM.

## 2024-02-20 ENCOUNTER — INITIAL PRENATAL (OUTPATIENT)
Dept: OBSTETRICS AND GYNECOLOGY | Facility: CLINIC | Age: 24
End: 2024-02-20
Payer: COMMERCIAL

## 2024-02-20 VITALS — SYSTOLIC BLOOD PRESSURE: 122 MMHG | WEIGHT: 244 LBS | BODY MASS INDEX: 47.65 KG/M2 | DIASTOLIC BLOOD PRESSURE: 68 MMHG

## 2024-02-20 DIAGNOSIS — Z3A.08 8 WEEKS GESTATION OF PREGNANCY (HHS-HCC): ICD-10-CM

## 2024-02-20 DIAGNOSIS — F41.9 ANXIETY AND DEPRESSION: ICD-10-CM

## 2024-02-20 DIAGNOSIS — F32.A ANXIETY AND DEPRESSION: ICD-10-CM

## 2024-02-20 DIAGNOSIS — O21.9 NAUSEA AND VOMITING IN PREGNANCY PRIOR TO 22 WEEKS GESTATION (HHS-HCC): ICD-10-CM

## 2024-02-20 DIAGNOSIS — Z34.90 PRENATAL CARE, ANTEPARTUM (HHS-HCC): Primary | ICD-10-CM

## 2024-02-20 DIAGNOSIS — E03.9 ACQUIRED HYPOTHYROIDISM: ICD-10-CM

## 2024-02-20 DIAGNOSIS — K76.0 NAFLD (NONALCOHOLIC FATTY LIVER DISEASE): ICD-10-CM

## 2024-02-20 DIAGNOSIS — E03.9 HYPOTHYROIDISM, UNSPECIFIED TYPE: ICD-10-CM

## 2024-02-20 DIAGNOSIS — E66.01 OBESITY, CLASS III, BMI 40-49.9 (MORBID OBESITY) (MULTI): ICD-10-CM

## 2024-02-20 DIAGNOSIS — K76.0 NAFL (NONALCOHOLIC FATTY LIVER): ICD-10-CM

## 2024-02-20 DIAGNOSIS — O09.01 PREGNANCY WITH HISTORY OF INFERTILITY IN FIRST TRIMESTER (HHS-HCC): ICD-10-CM

## 2024-02-20 LAB
ABO GROUP (TYPE) IN BLOOD: NORMAL
ALBUMIN SERPL BCP-MCNC: 4.1 G/DL (ref 3.4–5)
ALP SERPL-CCNC: 63 U/L (ref 33–110)
ALT SERPL W P-5'-P-CCNC: 24 U/L (ref 7–45)
ANION GAP SERPL CALC-SCNC: 13 MMOL/L (ref 10–20)
ANTIBODY SCREEN: NORMAL
AST SERPL W P-5'-P-CCNC: 15 U/L (ref 9–39)
BILIRUB SERPL-MCNC: 0.3 MG/DL (ref 0–1.2)
BUN SERPL-MCNC: 9 MG/DL (ref 6–23)
CALCIUM SERPL-MCNC: 9.1 MG/DL (ref 8.6–10.3)
CHLORIDE SERPL-SCNC: 101 MMOL/L (ref 98–107)
CO2 SERPL-SCNC: 22 MMOL/L (ref 21–32)
CREAT SERPL-MCNC: 0.52 MG/DL (ref 0.5–1.05)
EGFRCR SERPLBLD CKD-EPI 2021: >90 ML/MIN/1.73M*2
ERYTHROCYTE [DISTWIDTH] IN BLOOD BY AUTOMATED COUNT: 14.6 % (ref 11.5–14.5)
EST. AVERAGE GLUCOSE BLD GHB EST-MCNC: 97 MG/DL
GLUCOSE SERPL-MCNC: 104 MG/DL (ref 74–99)
HBA1C MFR BLD: 5 %
HBV SURFACE AG SERPL QL IA: NONREACTIVE
HCT VFR BLD AUTO: 35.1 % (ref 36–46)
HCV AB SER QL: NONREACTIVE
HGB BLD-MCNC: 11.9 G/DL (ref 12–16)
HIV 1+2 AB+HIV1 P24 AG SERPL QL IA: NONREACTIVE
MCH RBC QN AUTO: 28.7 PG (ref 26–34)
MCHC RBC AUTO-ENTMCNC: 33.9 G/DL (ref 32–36)
MCV RBC AUTO: 85 FL (ref 80–100)
NRBC BLD-RTO: 0 /100 WBCS (ref 0–0)
PLATELET # BLD AUTO: 319 X10*3/UL (ref 150–450)
POTASSIUM SERPL-SCNC: 3.8 MMOL/L (ref 3.5–5.3)
PROT SERPL-MCNC: 6.8 G/DL (ref 6.4–8.2)
RBC # BLD AUTO: 4.15 X10*6/UL (ref 4–5.2)
REFLEX ADDED, ANEMIA PANEL: NORMAL
RH FACTOR (ANTIGEN D): NORMAL
RUBV IGG SERPL IA-ACNC: 1.2 IA
RUBV IGG SERPL QL IA: POSITIVE
SODIUM SERPL-SCNC: 132 MMOL/L (ref 136–145)
TREPONEMA PALLIDUM IGG+IGM AB [PRESENCE] IN SERUM OR PLASMA BY IMMUNOASSAY: NONREACTIVE
TSH SERPL-ACNC: 2.67 MIU/L (ref 0.44–3.98)
WBC # BLD AUTO: 9.9 X10*3/UL (ref 4.4–11.3)

## 2024-02-20 PROCEDURE — 87340 HEPATITIS B SURFACE AG IA: CPT

## 2024-02-20 PROCEDURE — 86900 BLOOD TYPING SEROLOGIC ABO: CPT

## 2024-02-20 PROCEDURE — 36415 COLL VENOUS BLD VENIPUNCTURE: CPT

## 2024-02-20 PROCEDURE — 84443 ASSAY THYROID STIM HORMONE: CPT

## 2024-02-20 PROCEDURE — 83036 HEMOGLOBIN GLYCOSYLATED A1C: CPT

## 2024-02-20 PROCEDURE — 80053 COMPREHEN METABOLIC PANEL: CPT

## 2024-02-20 PROCEDURE — 87800 DETECT AGNT MULT DNA DIREC: CPT

## 2024-02-20 PROCEDURE — 83021 HEMOGLOBIN CHROMOTOGRAPHY: CPT

## 2024-02-20 PROCEDURE — 83020 HEMOGLOBIN ELECTROPHORESIS: CPT | Performed by: STUDENT IN AN ORGANIZED HEALTH CARE EDUCATION/TRAINING PROGRAM

## 2024-02-20 PROCEDURE — 87389 HIV-1 AG W/HIV-1&-2 AB AG IA: CPT

## 2024-02-20 PROCEDURE — 86850 RBC ANTIBODY SCREEN: CPT

## 2024-02-20 PROCEDURE — 86803 HEPATITIS C AB TEST: CPT

## 2024-02-20 PROCEDURE — 85027 COMPLETE CBC AUTOMATED: CPT

## 2024-02-20 PROCEDURE — 86901 BLOOD TYPING SEROLOGIC RH(D): CPT

## 2024-02-20 PROCEDURE — 87086 URINE CULTURE/COLONY COUNT: CPT

## 2024-02-20 PROCEDURE — 86780 TREPONEMA PALLIDUM: CPT

## 2024-02-20 PROCEDURE — 0500F INITIAL PRENATAL CARE VISIT: CPT | Performed by: STUDENT IN AN ORGANIZED HEALTH CARE EDUCATION/TRAINING PROGRAM

## 2024-02-20 PROCEDURE — 86317 IMMUNOASSAY INFECTIOUS AGENT: CPT

## 2024-02-20 PROCEDURE — 87661 TRICHOMONAS VAGINALIS AMPLIF: CPT

## 2024-02-20 RX ORDER — PYRIDOXINE HCL (VITAMIN B6) 25 MG
25 TABLET ORAL EVERY 6 HOURS PRN
Qty: 60 TABLET | Refills: 1 | Status: SHIPPED | OUTPATIENT
Start: 2024-02-20 | End: 2024-04-22

## 2024-02-20 ASSESSMENT — EDINBURGH POSTNATAL DEPRESSION SCALE (EPDS)
I HAVE BEEN ANXIOUS OR WORRIED FOR NO GOOD REASON: HARDLY EVER
I HAVE BEEN SO UNHAPPY THAT I HAVE BEEN CRYING: NO, NEVER
TOTAL SCORE: 2
I HAVE LOOKED FORWARD WITH ENJOYMENT TO THINGS: AS MUCH AS I EVER DID
I HAVE FELT SAD OR MISERABLE: NO, NOT AT ALL
THINGS HAVE BEEN GETTING ON TOP OF ME: NO, I HAVE BEEN COPING AS WELL AS EVER
I HAVE BEEN SO UNHAPPY THAT I HAVE HAD DIFFICULTY SLEEPING: NOT AT ALL
I HAVE BEEN ABLE TO LAUGH AND SEE THE FUNNY SIDE OF THINGS: AS MUCH AS I ALWAYS COULD
I HAVE FELT SCARED OR PANICKY FOR NO GOOD REASON: NO, NOT MUCH
I HAVE BLAMED MYSELF UNNECESSARILY WHEN THINGS WENT WRONG: NO, NEVER
THE THOUGHT OF HARMING MYSELF HAS OCCURRED TO ME: NEVER

## 2024-02-20 NOTE — PROGRESS NOTES
Subjective   Patient ID [id number]   Katy Roldan is a 23 y.o.  at 8w1d with a working estimated date of delivery of 2024, by Est. Date of Conception who presents for an initial prenatal visit. Pregnancy planned and desired, conceived with IUI and clomid/letrozole. Having daily nausea with 4-5 episodes of emesis per week. No cramping or bleeding.      Her pregnancy is notable for:  -BMI 47  -PCOS, on metformin  -Anxiety/depression on zoloft  -Hypothyroid, synthroid recently increased to 137mcg daily  -NAFLD with h/o mild transaminitis    OBHx: SAB x2  GynHx: denies STIs or abnormal paps  PMHx: as above  SurgHx: wisdom teeth  Meds/Allergies: reviewed and UTD  Social: denies t/e/i    OB History    Para Term  AB Living   3       2     SAB IAB Ectopic Multiple Live Births   2              # Outcome Date GA Lbr Lewis/2nd Weight Sex Delivery Anes PTL Lv   3 Current            2 SAB 23     Biochemical      1 SAB              [place]arkia  Depression Scale Total: 2    Objective   Physical Exam  Weight: 111 kg (244 lb)  Expected Total Weight Gain: 5 kg (11 lb)-9 kg (19 lb)   Pregravid BMI: 46.29  BP: 122/68          Assessment/Plan     Katy Roldan is a 23 y.o.  at 8w1d with a working estimated date of delivery of 2024, by Est. Date of Conception who presents for an initial prenatal visit.     Single viable IUP measuring 8.1wga on bedside ultrasound today. Taking PNV. B6 and unisom prescribed for nausea. PNLs including A1c, TSH with reflex, and CMP ordered today. Declines cfDNA. Already had carrier testing with [facility]. Already had flu shot this year. Reviewed strong recommendation for COVID vaccination in pregnancy, patient agreeable. Taking 81mg ASA for [facility], instructed to increase to 162mg at 12wga for PEC PPx. NT ordered. Pre-pregnancy BMI = 47, reviewed recommendation for 11-20 lb total gestational weight gain. Discussed plan for growths,  testing, and delivery  timing for BMI. Reviewed BMI restrictions at [facility]. Desires to continue care with [facility] and is aware may need to deliver at [facility] pending BMI. On Metformin for PCOS, never had diagnosis of DM. To stop metformin now. Will plan for early A1c. Discussed SSRI in pregnancy. Support continued use. Did review risk of poor  adaptation syndrome which is self-limited. Oriented to practice, collaborative care, [facility]. Pap UTD. Remainder of plan per problem list below.     Problem List Items Addressed This Visit       Acquired hypothyroidism    Overview     -Last TSH  () -> synthroid increased to 137mcg daily  [ ] TSH sent with [facility]         Anxiety and depression    Overview     -On sertraline         NAFL (nonalcoholic fatty liver)    Overview     -History of mild transaminitis (LFTs in 50s) with hepatic steatosis noted on liver US in   -LFTs sent with [facility]         Obesity, Class III, BMI 40-49.9 (morbid obesity) (CMS/HCC)    Overview     -Aware of BMI restrictions at [facility] (256 lb = BMI 50)  -For growth at 30 and 36wga, NSTs at 34wga, and delivery in 39th week         Pregnancy with history of infertility in first trimester    Overview     -h/o RPL and PCOS  -Conceived with IUI and clomid/letrozole combo  -APLS labs notable for isolated elevated DRVVT ratio that was normal on recheck (NOT meeting criteria for APLS)  -Metformin for PCOS, stopped at first PNV         Prenatal care, antepartum - Primary    Overview     -s/p flu shot, counseled on COVID booster  -Last pap (2022): NIL  -Declines cfDNA, already had carrier testing with [facility]  [ ] Growth at 30 and 36wga, NSTs at 34wga, delivery in 39th week    Next Visit  [ ] Confirm increased ASA to 162mg   [ ] Confirm NT scheduled         Relevant Orders    Urine culture    C. trachomatis + N. gonorrhoeae, Amplified    Trichomonas vaginalis, Amplified    US MAC OB imaging order    CBC Anemia Panel With Reflex,Pregnancy    Hemoglobin A1C    Hemoglobin  Identification with Path Review    Rubella Antibody, IgG    HIV 1/2 Antigen/Antibody Screen with Reflex to Confirmation    Hepatitis B Surface Antigen    Hepatitis C Antibody    Syphilis Screen with Reflex    Type And Screen     Other Visit Diagnoses       Hypothyroidism, unspecified type        Relevant Orders    TSH with reflex to Free T4 if abnormal    NAFLD (nonalcoholic fatty liver disease)        Relevant Orders    Comprehensive Metabolic Panel    8 weeks gestation of pregnancy        Nausea and vomiting in pregnancy prior to 22 weeks gestation        Relevant Medications    doxylamine (Unisom, doxylamine,) 25 mg tablet    pyridoxine (Vitamin B-6) 25 mg tablet          RTC in 4 weeks.    Linda Dawkins MD

## 2024-02-21 DIAGNOSIS — E03.8 OTHER SPECIFIED HYPOTHYROIDISM: ICD-10-CM

## 2024-02-21 LAB
BACTERIA UR CULT: NORMAL
C TRACH RRNA SPEC QL NAA+PROBE: NEGATIVE
N GONORRHOEA DNA SPEC QL PROBE+SIG AMP: NEGATIVE
T VAGINALIS RRNA SPEC QL NAA+PROBE: NEGATIVE

## 2024-02-21 RX ORDER — LEVOTHYROXINE SODIUM 150 UG/1
150 TABLET ORAL
Qty: 90 TABLET | Refills: 3 | Status: SHIPPED | OUTPATIENT
Start: 2024-02-21 | End: 2025-02-20

## 2024-02-22 ENCOUNTER — TELEPHONE (OUTPATIENT)
Dept: OBSTETRICS AND GYNECOLOGY | Facility: CLINIC | Age: 24
End: 2024-02-22
Payer: COMMERCIAL

## 2024-02-22 LAB
HEMOGLOBIN A2: 2.5 % (ref 2–3.5)
HEMOGLOBIN A: 97.1 % (ref 95.8–98)
HEMOGLOBIN F: 0.4 % (ref 0–2)
HEMOGLOBIN IDENTIFICATION INTERPRETATION: NORMAL
PATH REVIEW-HGB IDENTIFICATION: NORMAL

## 2024-02-22 NOTE — TELEPHONE ENCOUNTER
----- Message from Linda Dawkins MD sent at 2/21/2024  3:49 PM EST -----  Prenatal labs including A1c and LFTs normal. TSH much better but still not quite within goal (<2.5). I am going to increase her synthroid from 137mcg to 150mcg daily and we will check her TSH again at her next visit. Please call her and let her know. Thanks!

## 2024-03-04 NOTE — PROGRESS NOTES
Patient already had tsh repeated at OBGYN office and plan made with Dr. Correa. Patient increased to 150mcg synthroid and will repeat with her OBGYN as planned.   KARI CANCINO on 3/4/24 at 11:11 AM.

## 2024-03-22 ENCOUNTER — ROUTINE PRENATAL (OUTPATIENT)
Dept: OBSTETRICS AND GYNECOLOGY | Facility: CLINIC | Age: 24
End: 2024-03-22
Payer: COMMERCIAL

## 2024-03-22 VITALS
DIASTOLIC BLOOD PRESSURE: 70 MMHG | BODY MASS INDEX: 47.14 KG/M2 | WEIGHT: 241.38 LBS | SYSTOLIC BLOOD PRESSURE: 110 MMHG

## 2024-03-22 DIAGNOSIS — Z3A.12 12 WEEKS GESTATION OF PREGNANCY (HHS-HCC): Primary | ICD-10-CM

## 2024-03-22 DIAGNOSIS — O09.01 PREGNANCY WITH HISTORY OF INFERTILITY IN FIRST TRIMESTER (HHS-HCC): ICD-10-CM

## 2024-03-22 DIAGNOSIS — K76.0 NAFL (NONALCOHOLIC FATTY LIVER): ICD-10-CM

## 2024-03-22 DIAGNOSIS — Z34.90 PRENATAL CARE, ANTEPARTUM (HHS-HCC): ICD-10-CM

## 2024-03-22 DIAGNOSIS — E03.9 HYPOTHYROIDISM, UNSPECIFIED TYPE: ICD-10-CM

## 2024-03-22 DIAGNOSIS — E03.9 ACQUIRED HYPOTHYROIDISM: ICD-10-CM

## 2024-03-22 LAB — TSH SERPL-ACNC: 0.57 MIU/L (ref 0.44–3.98)

## 2024-03-22 PROCEDURE — 0501F PRENATAL FLOW SHEET: CPT | Performed by: STUDENT IN AN ORGANIZED HEALTH CARE EDUCATION/TRAINING PROGRAM

## 2024-03-22 PROCEDURE — 84443 ASSAY THYROID STIM HORMONE: CPT

## 2024-03-22 PROCEDURE — 36415 COLL VENOUS BLD VENIPUNCTURE: CPT

## 2024-03-22 RX ORDER — ASPIRIN 81 MG/1
162 TABLET ORAL DAILY
Qty: 180 TABLET | Refills: 2 | Status: SHIPPED | OUTPATIENT
Start: 2024-03-22 | End: 2025-03-22

## 2024-03-22 RX ORDER — ASPIRIN 81 MG/1
81 TABLET ORAL DAILY
COMMUNITY
End: 2024-03-22 | Stop reason: ALTCHOICE

## 2024-03-22 NOTE — PROGRESS NOTES
Subjective   Patient ID [id number]   Katy Roldan is a 23 y.o.  at 12w4d with a working estimated date of delivery of 2024, by Est. Date of Conception who presents for a routine prenatal visit. Nausea improved. Some cramping, no bleeding. Having some HAs.    Objective   Physical Exam  Vitals:    24 0826   BP: 110/70      Weight: 109 kg (241 lb 6 oz), Pregravid BMI: 46.29  Expected Total Weight Gain: 5 kg (11 lb)-9 kg (19 lb)   Fundal height and FHR per prenatal flowsheet    Assessment/Plan     Katy Roldan is a 23 y.o.  at 12w4d with a working estimated date of delivery of 2024, by Est. Date of Conception who presents for a routine prenatal visit.    Reviewed PNLs, WNL including A1c. History of NAFL, baseline LFTs WNL. TSH 2.67 at last visit, synthroid increased to 150mcg, to repeat TSH today. Discussed NT, declines. Encouraged to schedule anatomy. Having HAs, discussed hydration, caffeine, and tylenol. Stopped her progesterone. To increase ASA to 162mg this week.  Remainder of plan per problem list as below.     Problem List Items Addressed This Visit       Acquired hypothyroidism    Overview     -TSH / () -> synthroid increased to 137mcg daily  -TSH 2.67 () -> synthroid increased to 150mcg daily  -Repeat TSH sent 3/22         NAFL (nonalcoholic fatty liver)    Overview     -History of mild transaminitis (LFTs in 50s) with hepatic steatosis noted on liver US in   -LFTs WNL in first trimester         Pregnancy with history of infertility in first trimester    Overview     -h/o RPL and PCOS  -Conceived with IUI and clomid/letrozole combo  -APLS labs notable for isolated elevated DRVVT ratio that was normal on recheck (NOT meeting criteria for APLS)  -Metformin for PCOS, stopped at first PNV  -Early hgbA1c 5.0%         Prenatal care, antepartum    Overview     -s/p flu shot, counseled on COVID booster  -Last pap (2022): NIL  -Declines cfDNA and NT, already had  carrier testing with [facility]  [ ] Growth at 30 and 36wga, NSTs at 34wga, delivery in 39th week  [ ] TSH q trimester    Next Visit  [ ] Confirm anatomy scheduled  [ ] follow-up HA          Other Visit Diagnoses       12 weeks gestation of pregnancy    -  Primary    Relevant Medications    aspirin 81 mg EC tablet    Hypothyroidism, unspecified type              Follow up in 4 weeks for a routine prenatal visit.    Linda Dawkins MD

## 2024-04-22 ENCOUNTER — ROUTINE PRENATAL (OUTPATIENT)
Dept: OBSTETRICS AND GYNECOLOGY | Facility: CLINIC | Age: 24
End: 2024-04-22
Payer: COMMERCIAL

## 2024-04-22 VITALS — WEIGHT: 241.2 LBS | SYSTOLIC BLOOD PRESSURE: 112 MMHG | BODY MASS INDEX: 47.11 KG/M2 | DIASTOLIC BLOOD PRESSURE: 72 MMHG

## 2024-04-22 DIAGNOSIS — F41.9 ANXIETY AND DEPRESSION: ICD-10-CM

## 2024-04-22 DIAGNOSIS — Z34.90 PRENATAL CARE, ANTEPARTUM (HHS-HCC): ICD-10-CM

## 2024-04-22 DIAGNOSIS — Z3A.17 17 WEEKS GESTATION OF PREGNANCY (HHS-HCC): Primary | ICD-10-CM

## 2024-04-22 DIAGNOSIS — F32.A ANXIETY AND DEPRESSION: ICD-10-CM

## 2024-04-22 DIAGNOSIS — E03.9 ACQUIRED HYPOTHYROIDISM: ICD-10-CM

## 2024-04-22 PROCEDURE — 0501F PRENATAL FLOW SHEET: CPT | Performed by: STUDENT IN AN ORGANIZED HEALTH CARE EDUCATION/TRAINING PROGRAM

## 2024-04-22 RX ORDER — SERTRALINE HYDROCHLORIDE 50 MG/1
50 TABLET, FILM COATED ORAL DAILY
COMMUNITY
Start: 2024-04-12

## 2024-04-22 NOTE — PROGRESS NOTES
Subjective   Patient ID [id number]   Katy Roldan is a 23 y.o.  at 17w0d with a working estimated date of delivery of 2024, by Est. Date of Conception who presents for a routine prenatal visit. No cramping or bleeding. Doing well    Objective   Physical Exam  Vitals:    24 1437   BP: 112/72      Weight: 109 kg (241 lb 3.2 oz), Pregravid BMI: 46.29  Expected Total Weight Gain: 5 kg (11 lb)-9 kg (19 lb)   Fundal height and FHR per prenatal flowsheet    Assessment/Plan     Katy Roldan is a 23 y.o.  at 17w0d with a working estimated date of delivery of 2024, by Est. Date of Conception who presents for a routine prenatal visit.    Has anatomy scheduled. HAs resolved. Weight stable. Discussed SSRI use in pregnancy including risk of poor  adaptation syndrome however do not recommend cessation in third trimester. Remainder of plan per problem list as below.     Problem List Items Addressed This Visit       Acquired hypothyroidism    Overview     -TSH / () -> synthroid increased to 137mcg daily  -TSH 2.67 () -> synthroid increased to 150mcg daily  -TSH 0.57 (3/22)  -For q trimester TSH         Anxiety and depression    Overview     -On sertraline 50mg daily         Prenatal care, antepartum ([facility])    Overview     -s/p flu shot, counseled on COVID booster  -Last pap (2022): NIL  -Declines cfDNA and NT, already had carrier testing with [facility]  [ ] Growth at 30 and 36wga, NSTs at 34wga, delivery in 39th week  [ ] TSH q trimester    Next Visit  [ ] Review anatomy  [ ] Glucola supplies          Other Visit Diagnoses       17 weeks gestation of pregnancy ([facility])    -  Primary          Follow up in 4 weeks for a routine prenatal visit.    Linda Dawkins MD

## 2024-05-03 NOTE — PROGRESS NOTES
Medications ordered for upcoming IUI cycle.    Patient is being seen by the  Fertility clinic for the following:     Treatment plan:   IUI #2  Reasons for treatment: Polycystic Ovarian Syndrome  Treatment start date: 12/24/2023  Treatment type: Non-ART  Fertilization method: IUI  Sperm collection methods: Ejaculation       Treatment type: Non-ART    Date Done Consultation Results/Comments   4/28/2023 Medication Protocol Stimulation protocol: Clomid 100 mg & letrozole 5 mg days 3-7/monitoring/IUI/ with HCG trigger + LP prometrium 100 mg BID PV to start 3 days after IUI x 2 additional cycles  Trigger plan: HCG  Adjuncts:  none  Notes: N/A         The following medications were sent into  [facility] on 12/26/23 for the above treatment:   Pregnyl 10,000IU vial for trigger , Clomid 50mg tablet, and Letrozole 2.5mg tablet        Estela Alvarado, PharmD

## 2024-05-07 ENCOUNTER — HOSPITAL ENCOUNTER (OUTPATIENT)
Dept: RADIOLOGY | Facility: CLINIC | Age: 24
Discharge: HOME | End: 2024-05-07
Payer: COMMERCIAL

## 2024-05-07 DIAGNOSIS — Z34.90 PRENATAL CARE, ANTEPARTUM (HHS-HCC): ICD-10-CM

## 2024-05-07 PROCEDURE — 76811 OB US DETAILED SNGL FETUS: CPT | Performed by: MEDICAL GENETICS

## 2024-05-07 PROCEDURE — 76811 OB US DETAILED SNGL FETUS: CPT

## 2024-05-20 ENCOUNTER — APPOINTMENT (OUTPATIENT)
Dept: OBSTETRICS AND GYNECOLOGY | Facility: CLINIC | Age: 24
End: 2024-05-20
Payer: COMMERCIAL

## 2024-05-20 ENCOUNTER — ROUTINE PRENATAL (OUTPATIENT)
Dept: OBSTETRICS AND GYNECOLOGY | Facility: CLINIC | Age: 24
End: 2024-05-20
Payer: COMMERCIAL

## 2024-05-20 VITALS — DIASTOLIC BLOOD PRESSURE: 64 MMHG | WEIGHT: 243.8 LBS | BODY MASS INDEX: 47.61 KG/M2 | SYSTOLIC BLOOD PRESSURE: 108 MMHG

## 2024-05-20 DIAGNOSIS — Z3A.21 21 WEEKS GESTATION OF PREGNANCY (HHS-HCC): Primary | ICD-10-CM

## 2024-05-20 DIAGNOSIS — E66.01 OBESITY, CLASS III, BMI 40-49.9 (MORBID OBESITY) (MULTI): ICD-10-CM

## 2024-05-20 DIAGNOSIS — E03.9 ACQUIRED HYPOTHYROIDISM: ICD-10-CM

## 2024-05-20 DIAGNOSIS — Z34.90 PRENATAL CARE, ANTEPARTUM (HHS-HCC): ICD-10-CM

## 2024-05-20 PROCEDURE — 0501F PRENATAL FLOW SHEET: CPT | Performed by: STUDENT IN AN ORGANIZED HEALTH CARE EDUCATION/TRAINING PROGRAM

## 2024-05-20 NOTE — PROGRESS NOTES
Subjective   Patient ID [id number]   Katy Roldan is a 23 y.o.  at 21w0d with a working estimated date of delivery of 2024, by Est. Date of Conception who presents for a routine prenatal visit. Starting to feel FM. Having low back and hip pain.    Objective   Physical Exam  Vitals:    24 1604   BP: 108/64      Weight: 111 kg (243 lb 12.8 oz), Pregravid BMI: 46.29  Expected Total Weight Gain: 5 kg (11 lb)-9 kg (19 lb)   Fundal height and FHR per prenatal flowsheet    Assessment/Plan     Katy Roldan is a 23 y.o.  at 21w0d with a working estimated date of delivery of 2024, by Est. Date of Conception who presents for a routine prenatal visit.    Anatomy normal but incomplete, has follow-up scheduled. Glucola supplies given for next time. Will recheck TSH at that lab draw. Discussed common discomforts of pregnancy. Reviewed plan for growth at 30 and 36wga and NSTs at 34wga for class 3 obesity. Remainder of plan per problem list as below.     Problem List Items Addressed This Visit       Prenatal care, antepartum ([facility])    Overview     -s/p flu shot, counseled on COVID booster  -Last pap (2022): NIL  -Declines cfDNA and NT, already had carrier testing with [facility]  [ ] Growth at 30 and 36wga, NSTs at 34wga, delivery in 39th week  [ ] TSH q trimester    Next Visit  [ ] Review follow-up anatomy, confirm scheduled for 30 week growth  [ ] Second tri labs including TSH with reflex         Obesity, Class III, BMI 40-49.9 (morbid obesity) (Multi)    Overview     -Aware of BMI restrictions at [facility] (256 lb = BMI 50)  -For growth at 30 and 36wga, NSTs at 34wga, and delivery in 39th week         Acquired hypothyroidism    Overview     -TSH / () -> synthroid increased to 137mcg daily  -TSH 2.67 () -> synthroid increased to 150mcg daily  -TSH 0.57 (3/22)  -For q trimester TSH          Other Visit Diagnoses       21 weeks gestation of pregnancy ([facility])    -  Primary          Follow up  in 4 weeks for a routine prenatal visit.    Linda Dawkins MD

## 2024-05-29 ENCOUNTER — HOSPITAL ENCOUNTER (OUTPATIENT)
Dept: RADIOLOGY | Facility: CLINIC | Age: 24
Discharge: HOME | End: 2024-05-29
Payer: COMMERCIAL

## 2024-05-29 DIAGNOSIS — Z34.90 PRENATAL CARE, ANTEPARTUM (HHS-HCC): ICD-10-CM

## 2024-05-29 PROCEDURE — 76816 OB US FOLLOW-UP PER FETUS: CPT

## 2024-05-29 PROCEDURE — 76816 OB US FOLLOW-UP PER FETUS: CPT | Performed by: OBSTETRICS & GYNECOLOGY

## 2024-06-20 ENCOUNTER — APPOINTMENT (OUTPATIENT)
Dept: OBSTETRICS AND GYNECOLOGY | Facility: CLINIC | Age: 24
End: 2024-06-20
Payer: COMMERCIAL

## 2024-06-21 ENCOUNTER — APPOINTMENT (OUTPATIENT)
Dept: OBSTETRICS AND GYNECOLOGY | Facility: CLINIC | Age: 24
End: 2024-06-21
Payer: COMMERCIAL

## 2024-06-21 ENCOUNTER — APPOINTMENT (OUTPATIENT)
Dept: PEDIATRIC CARDIOLOGY | Facility: HOSPITAL | Age: 24
End: 2024-06-21
Payer: COMMERCIAL

## 2024-06-21 VITALS
SYSTOLIC BLOOD PRESSURE: 116 MMHG | BODY MASS INDEX: 47.39 KG/M2 | WEIGHT: 241.4 LBS | HEART RATE: 97 BPM | HEIGHT: 60 IN | DIASTOLIC BLOOD PRESSURE: 79 MMHG

## 2024-06-21 VITALS
DIASTOLIC BLOOD PRESSURE: 78 MMHG | WEIGHT: 240.25 LBS | BODY MASS INDEX: 46.92 KG/M2 | SYSTOLIC BLOOD PRESSURE: 109 MMHG

## 2024-06-21 DIAGNOSIS — O35.8XX0 MATERNAL CARE FOR OTHER (SUSPECTED) FETAL ABNORMALITY AND DAMAGE, NOT APPLICABLE OR UNSPECIFIED (HHS-HCC): ICD-10-CM

## 2024-06-21 DIAGNOSIS — O35.BXX0 ABNORMAL FETAL ECHOCARDIOGRAPHY AFFECTING ANTEPARTUM CARE OF MOTHER, SINGLE OR UNSPECIFIED FETUS (HHS-HCC): Primary | ICD-10-CM

## 2024-06-21 DIAGNOSIS — O28.3 ABNORMAL FETAL ULTRASOUND: ICD-10-CM

## 2024-06-21 DIAGNOSIS — Z3A.25 25 WEEKS GESTATION OF PREGNANCY (HHS-HCC): Primary | ICD-10-CM

## 2024-06-21 DIAGNOSIS — Z13.1 SCREENING FOR DIABETES MELLITUS (DM): ICD-10-CM

## 2024-06-21 DIAGNOSIS — E03.9 ACQUIRED HYPOTHYROIDISM: ICD-10-CM

## 2024-06-21 DIAGNOSIS — Z34.90 PRENATAL CARE, ANTEPARTUM (HHS-HCC): ICD-10-CM

## 2024-06-21 LAB
ERYTHROCYTE [DISTWIDTH] IN BLOOD BY AUTOMATED COUNT: 14.8 % (ref 11.5–14.5)
GLUCOSE 1H P 50 G GLC PO SERPL-MCNC: 109 MG/DL
HCT VFR BLD AUTO: 35.2 % (ref 36–46)
HGB BLD-MCNC: 11.1 G/DL (ref 12–16)
MCH RBC QN AUTO: 27.2 PG (ref 26–34)
MCHC RBC AUTO-ENTMCNC: 31.5 G/DL (ref 32–36)
MCV RBC AUTO: 86 FL (ref 80–100)
NRBC BLD-RTO: 0 /100 WBCS (ref 0–0)
PLATELET # BLD AUTO: 279 X10*3/UL (ref 150–450)
RBC # BLD AUTO: 4.08 X10*6/UL (ref 4–5.2)
TSH SERPL-ACNC: 1.2 MIU/L (ref 0.44–3.98)
WBC # BLD AUTO: 9.2 X10*3/UL (ref 4.4–11.3)

## 2024-06-21 PROCEDURE — 82947 ASSAY GLUCOSE BLOOD QUANT: CPT

## 2024-06-21 PROCEDURE — 99204 OFFICE O/P NEW MOD 45 MIN: CPT | Performed by: PEDIATRICS

## 2024-06-21 PROCEDURE — 76827 ECHO EXAM OF FETAL HEART: CPT | Performed by: PEDIATRICS

## 2024-06-21 PROCEDURE — 93325 DOPPLER ECHO COLOR FLOW MAPG: CPT | Performed by: PEDIATRICS

## 2024-06-21 PROCEDURE — 1036F TOBACCO NON-USER: CPT | Performed by: PEDIATRICS

## 2024-06-21 PROCEDURE — 85027 COMPLETE CBC AUTOMATED: CPT

## 2024-06-21 PROCEDURE — 99214 OFFICE O/P EST MOD 30 MIN: CPT | Mod: 25 | Performed by: PEDIATRICS

## 2024-06-21 PROCEDURE — 84443 ASSAY THYROID STIM HORMONE: CPT

## 2024-06-21 PROCEDURE — 0501F PRENATAL FLOW SHEET: CPT | Performed by: STUDENT IN AN ORGANIZED HEALTH CARE EDUCATION/TRAINING PROGRAM

## 2024-06-21 PROCEDURE — 76825 ECHO EXAM OF FETAL HEART: CPT

## 2024-06-21 PROCEDURE — 36415 COLL VENOUS BLD VENIPUNCTURE: CPT

## 2024-06-21 PROCEDURE — 76825 ECHO EXAM OF FETAL HEART: CPT | Performed by: PEDIATRICS

## 2024-06-21 PROCEDURE — 86780 TREPONEMA PALLIDUM: CPT

## 2024-06-21 NOTE — PROGRESS NOTES
Katy Roldan was seen at the request of Nomi Remy for a chief complaint of suspected fetal anomaly, assisted reproduction and limited heart views; a report with my findings is being sent via written or electronic means the referring physician with my recommendations for treatment.     I had the pleasure of seeing Katy Roldan in Pediatric Cardiology consultation at our our main campus location as part of our prenatal heart program for suspected fetal anomaly.  She is a 23 y.o. year-old  woman, currently 25w4d weeks gestation.  Her last menstrual period was in 2024.  Estimated date of delivery is 24.  There have been no pregnancy complications.   Pregnancy conceived with IUI and Clomid.  She has not been hospitalized during this pregnancy.  She had a first check blood test, which was normal.  She had a second trimester ultrasound which was normal.      Prior to the visit, I personally reviewed the cardiac portions of the obstetrical ultrasound performed on 2024.  The images are limited with no obvious cardiac defects.    Her previous obstetrical history is significant for 2 miscarriages.  Her past medical history is significant for anxiety and depression.  without complication.  She has no history of congenital heart disease, arrhythmia, cardiomyopathy, hypercholesterolemia, hypertension, diabetes, rheumatic heart disease, cancer, asthma, lupus, Sjogren syndrome, clotting disorder, depression, anxiety, alcohol abuse, phenylketonuria, or DiGeorge.  She has had no surgeries.  She is not taking any medications.  She has is allergic to montelukast, cefuroxime, and cetirizine..  She is currently taking prenatal vitamins.      Her family history is negative for congenital heart disease, early atherosclerosis, sudden cardiac death, long QT syndrome, cardiomyopathy, aortic aneurysm, or genetic or metabolic disease.      She currently lives with her spouse and is .  She works  "as .  She does not smoke.  She denies illicit drug use or alcohol abuse.  She denies verbal, sexual, or physical abuse.     Delivery Hospital: [facility]  Father of the baby's name: Heath Roldan    /79 (BP Location: Right arm, Patient Position: Sitting)   Pulse 97   Ht 1.52 m (4' 11.84\")   Wt 110 kg (241 lb 6.5 oz)   LMP  (LMP Unknown)   BMI 47.39 kg/m²     She was resting comfortably in the examination room and alert, active and in no respiratory distress. Skin was without rash.  HEENT: moist mucous membranes, no JVD, goiter. Breathing is not labored.  She was acyanotic.  There was no peripheral edema.   The abdomen was gravid, soft, nontender with normal bowel sounds.  The liver was not palpable.  The spleen tip was not palpable.  She had a normal gait and normal strength in all extremities.  Cranial nerves II - XII are intact.  She had no clubbing, cyanosis, or edema.    A two-dimensional and Doppler fetal echocardiogram was performed today and interpreted by me at 25w4d weeks gestation.  The fetal echocardiogram showed normal segmental anatomy with no structural abnormalities found.  There is normal cardiac function.  There is no evidence of septation defect, right or left ventricular outflow obstruction or significant valvular regurgitation.  The fetal heart rate was within normal limits without ectopy or arrhythmia seen.  The spectral Doppler pattern across all valves, venous structures, and arterial structures was within normal limits.  There is no pericardial effusion.  Please see full report for details.    In summary, Katy Roldan is a 23 y.o. year-old  woman, currently 25w4d weeks gestation, who had a normal fetal echocardiogram at today's visit.  Therefore, we did not make any changes to her current delivery plan.  We did not prescribe any medications.  We did not recommend intervention.  She does not necessarily need to follow up with pediatric " cardiology after the baby is born unless the pediatric team has any concerns or worries.   Thank you for allowing me to participate in Seneca's care.  If you have any further questions, please do not hesitate to contact me.     Jimmy Matthews M.D.  [facility], Director  Ambulatory Pediatric Cardiology   Division of Pediatric Cardiology  [facility]  [facility]   of Pediatrics, [facility]  [facility] -   [address] [place] [address]  [place] [id number]  Office:  [phone]  Fax:       [phone]  e-mail:  [email or web address]    I spent greater than 45 minutes in performance of this consultation, of which greater than 50% was related to coordination of care or counseling.

## 2024-06-21 NOTE — PATIENT INSTRUCTIONS
The fetal echocardiogram performed today was normal.  The heart is built normally.  The heart function is normal.  The heart beat is normal.  There is no need for changes to your delivery plan.  Your baby does not need to see pediatric cardiology unless your pediatrician has concerns.  Sometimes it is not possible to see all the heart structures because of the position or size of the fetus. This does not mean they are not there, but may mean that for technical reasons they cannot be assessed. Sometimes this information may not be important; while in some cases it means that definite answers are not possible. The echocardiographer will discuss this with you if necessary, and repeat studies are frequently performed later in the pregnancy.     Certain congenital heart abnormalities are also hard to detect by fetal echocardiograms, but often these are simple abnormalities. We do not mention this to concern you, but rather that you understand that there are technical limitations to such studies. It remains important that your pediatrician provides a normal careful medical examination of the baby (including the heart) takes place after birth, and if there were any suspicious cardiac findings, that these are evaluated in the usual way irrespective of the findings at fetal study.     Certain communications between the two sides of the circulation are normally present in all developing babies and normally close after birth. We are not able to tell in advance whether this will occur, however there is only a tiny chance that they will not. Persistence of these structures is generally not a difficult problem to deal with.     We direct our attention only to the heart, where we have special expertise. This is not the same as your general obstetric ultrasound scan. Other ultrasound information about the fetus can be obtained from an obstetric ultrasonographer or your obstetrician.

## 2024-06-21 NOTE — LETTER
2024     Nomi Remy MD  [address]  [facility]  [address]  [facility] [address]    Patient: Katy Roldan   YOB: 2000   Date of Visit: 2024       Dear Dr. Nomi Remy MD:    Thank you for referring Katy Roldan to me for evaluation. Below are my notes for this consultation.  If you have questions, please do not hesitate to call me. I look forward to following your patient along with you.       Sincerely,     Jimmy Matthews MD      CC: MD Michelle Bullard, APRN-CN, [facility]  Isha Gutierrez, APRN-CNP  ______________________________________________________________________________________    Katy Roldan was seen at the request of Nomi Remy for a chief complaint of suspected fetal anomaly, assisted reproduction and limited heart views; a report with my findings is being sent via written or electronic means the referring physician with my recommendations for treatment.     I had the pleasure of seeing Katy Roldan in Pediatric Cardiology consultation at our our main campus location as part of our prenatal heart program for suspected fetal anomaly.  She is a 23 y.o. year-old  woman, currently 25w4d weeks gestation.  Her last menstrual period was in 2024.  Estimated date of delivery is 24.  There have been no pregnancy complications.   Pregnancy conceived with IUI and Clomid.  She has not been hospitalized during this pregnancy.  She had a first check blood test, which was normal.  She had a second trimester ultrasound which was normal.      Prior to the visit, I personally reviewed the cardiac portions of the obstetrical ultrasound performed on 2024.  The images are limited with no obvious cardiac defects.    Her previous obstetrical history is significant for 2 miscarriages.  Her past medical history is significant for anxiety and depression.  without complication.  She has no  "history of congenital heart disease, arrhythmia, cardiomyopathy, hypercholesterolemia, hypertension, diabetes, rheumatic heart disease, cancer, asthma, lupus, Sjogren syndrome, clotting disorder, depression, anxiety, alcohol abuse, phenylketonuria, or DiGeorge.  She has had no surgeries.  She is not taking any medications.  She has is allergic to montelukast, cefuroxime, and cetirizine..  She is currently taking prenatal vitamins.      Her family history is negative for congenital heart disease, early atherosclerosis, sudden cardiac death, long QT syndrome, cardiomyopathy, aortic aneurysm, or genetic or metabolic disease.      She currently lives with her spouse and is .  She works as .  She does not smoke.  She denies illicit drug use or alcohol abuse.  She denies verbal, sexual, or physical abuse.     Delivery Hospital: [facility]  Father of the baby's name: Heath Roldan    /79 (BP Location: Right arm, Patient Position: Sitting)   Pulse 97   Ht 1.52 m (4' 11.84\")   Wt 110 kg (241 lb 6.5 oz)   LMP  (LMP Unknown)   BMI 47.39 kg/m²     She was resting comfortably in the examination room and alert, active and in no respiratory distress. Skin was without rash.  HEENT: moist mucous membranes, no JVD, goiter. Breathing is not labored.  She was acyanotic.  There was no peripheral edema.   The abdomen was gravid, soft, nontender with normal bowel sounds.  The liver was not palpable.  The spleen tip was not palpable.  She had a normal gait and normal strength in all extremities.  Cranial nerves II - XII are intact.  She had no clubbing, cyanosis, or edema.    A two-dimensional and Doppler fetal echocardiogram was performed today and interpreted by me at 25w4d weeks gestation.  The fetal echocardiogram showed normal segmental anatomy with no structural abnormalities found.  There is normal cardiac function.  There is no evidence of septation defect, right or left " ventricular outflow obstruction or significant valvular regurgitation.  The fetal heart rate was within normal limits without ectopy or arrhythmia seen.  The spectral Doppler pattern across all valves, venous structures, and arterial structures was within normal limits.  There is no pericardial effusion.  Please see full report for details.    In summary, Katy Roldan is a 23 y.o. year-old  woman, currently 25w4d weeks gestation, who had a normal fetal echocardiogram at today's visit.  Therefore, we did not make any changes to her current delivery plan.  We did not prescribe any medications.  We did not recommend intervention.  She does not necessarily need to follow up with pediatric cardiology after the baby is born unless the pediatric team has any concerns or worries.   Thank you for allowing me to participate in Katy's care.  If you have any further questions, please do not hesitate to contact me.     Jimmy Matthews M.D.  [facility], Director  Ambulatory Pediatric Cardiology   Division of Pediatric Cardiology  [facility]  [facility]   of Pediatrics, [facility]  [facility] [address]  [address] [place] [address]  [place] [id number]  Office:  [phone]  Fax:       [phone]  e-mail:  [email or web address]    I spent greater than 45 minutes in performance of this consultation, of which greater than 50% was related to coordination of care or counseling.

## 2024-06-21 NOTE — PROGRESS NOTES
Subjective   Patient ID [id number]   Katy Roldan is a 23 y.o.  at 25w4d with a working estimated date of delivery of 2024, by Est. Date of Conception who presents for a routine prenatal visit. Good FM, no OB complaints.    Objective   Physical Exam  Vitals:    24 1120   BP: 109/78      Weight: 109 kg (240 lb 4 oz), Pregravid BMI: 46.29  Expected Total Weight Gain: 5 kg (11 lb)-9 kg (19 lb)   Fundal height and FHR per prenatal flowsheet    Assessment/Plan     Katy Roldan is a 23 y.o.  at 25w4d with a working estimated date of delivery of 2024, by Est. Date of Conception who presents for a routine prenatal visit.    Second trimester labs today. Hypothyroid on synthroid 150mcg daily, to recheck TSH today. Discussed tdap next visit. Reviewed anatomy scan - incomplete heart views, has fetal echo today. Reminded to schedule 30 week growth. Reviewed plan for NSTs to start at 34wga for BMI. Measuring S>D. Has not gained weight yet this pregnancy, no nausea, for growths as above. Remainder of plan per problem list as below.     Problem List Items Addressed This Visit       Prenatal care, antepartum ([facility])    Overview     -s/p flu shot, counseled on COVID booster  -Last pap (2022): NIL  -Declines cfDNA and NT, already had carrier testing with [facility]  [ ] Growth at 30 and 36wga, NSTs at 34wga, delivery in 39th week  [ ] TSH q trimester    Next Visit  [ ] Tdap  [ ] Review fetal echo (incomplete heart views)  [ ] Confirm scheduled for 30 week growth         Acquired hypothyroidism    Overview     -TSH  () -> synthroid increased to 137mcg daily  -TSH 2.67 () -> synthroid increased to 150mcg daily  -TSH 0.57 (3/22)  -For q trimester TSH          Other Visit Diagnoses       25 weeks gestation of pregnancy ([facility])    -  Primary    Screening for diabetes mellitus (DM)        Relevant Orders    CBC Anemia Panel With Reflex,Pregnancy    Glucose, 1 Hour Screen, Pregnancy    Syphilis  Screen with Reflex    TSH with reflex to Free T4 if abnormal          Follow up in 4 weeks for a routine prenatal visit.    Linda Dawkins MD

## 2024-06-22 LAB
REFLEX ADDED, ANEMIA PANEL: NORMAL
TREPONEMA PALLIDUM IGG+IGM AB [PRESENCE] IN SERUM OR PLASMA BY IMMUNOASSAY: NONREACTIVE

## 2024-06-26 ENCOUNTER — APPOINTMENT (OUTPATIENT)
Dept: PEDIATRIC CARDIOLOGY | Facility: CLINIC | Age: 24
End: 2024-06-26
Payer: COMMERCIAL

## 2024-07-18 ENCOUNTER — APPOINTMENT (OUTPATIENT)
Dept: OBSTETRICS AND GYNECOLOGY | Facility: CLINIC | Age: 24
End: 2024-07-18
Payer: COMMERCIAL

## 2024-07-18 VITALS — BODY MASS INDEX: 47.71 KG/M2 | SYSTOLIC BLOOD PRESSURE: 103 MMHG | WEIGHT: 243 LBS | DIASTOLIC BLOOD PRESSURE: 64 MMHG

## 2024-07-18 DIAGNOSIS — Z3A.29 29 WEEKS GESTATION OF PREGNANCY (HHS-HCC): Primary | ICD-10-CM

## 2024-07-18 DIAGNOSIS — Z23 NEED FOR VACCINATION: ICD-10-CM

## 2024-07-18 DIAGNOSIS — Z34.90 PRENATAL CARE, ANTEPARTUM (HHS-HCC): ICD-10-CM

## 2024-07-18 DIAGNOSIS — K21.9 GASTROESOPHAGEAL REFLUX DISEASE WITHOUT ESOPHAGITIS: ICD-10-CM

## 2024-07-18 DIAGNOSIS — E66.01 OBESITY, CLASS III, BMI 40-49.9 (MORBID OBESITY) (MULTI): ICD-10-CM

## 2024-07-18 DIAGNOSIS — O09.01 PREGNANCY WITH HISTORY OF INFERTILITY IN FIRST TRIMESTER (HHS-HCC): ICD-10-CM

## 2024-07-18 PROCEDURE — 90715 TDAP VACCINE 7 YRS/> IM: CPT | Performed by: STUDENT IN AN ORGANIZED HEALTH CARE EDUCATION/TRAINING PROGRAM

## 2024-07-18 PROCEDURE — 0501F PRENATAL FLOW SHEET: CPT | Performed by: STUDENT IN AN ORGANIZED HEALTH CARE EDUCATION/TRAINING PROGRAM

## 2024-07-18 PROCEDURE — 90471 IMMUNIZATION ADMIN: CPT | Performed by: STUDENT IN AN ORGANIZED HEALTH CARE EDUCATION/TRAINING PROGRAM

## 2024-07-18 ASSESSMENT — EDINBURGH POSTNATAL DEPRESSION SCALE (EPDS)
THINGS HAVE BEEN GETTING ON TOP OF ME: NO, I HAVE BEEN COPING AS WELL AS EVER
TOTAL SCORE: 0
THE THOUGHT OF HARMING MYSELF HAS OCCURRED TO ME: NEVER
I HAVE BEEN SO UNHAPPY THAT I HAVE HAD DIFFICULTY SLEEPING: NOT AT ALL
I HAVE BEEN ANXIOUS OR WORRIED FOR NO GOOD REASON: NO, NOT AT ALL
I HAVE FELT SAD OR MISERABLE: NO, NOT AT ALL
I HAVE LOOKED FORWARD WITH ENJOYMENT TO THINGS: AS MUCH AS I EVER DID
I HAVE FELT SCARED OR PANICKY FOR NO GOOD REASON: NO, NOT AT ALL
I HAVE BLAMED MYSELF UNNECESSARILY WHEN THINGS WENT WRONG: NO, NEVER
I HAVE BEEN SO UNHAPPY THAT I HAVE BEEN CRYING: NO, NEVER
I HAVE BEEN ABLE TO LAUGH AND SEE THE FUNNY SIDE OF THINGS: AS MUCH AS I ALWAYS COULD

## 2024-07-18 NOTE — PROGRESS NOTES
Subjective   Patient ID [id number]   Katy Roldan is a 24 y.o.  at 29w3d with a working estimated date of delivery of 2024, by Est. Date of Conception who presents for a routine prenatal visit. Good FM, no OB complaints. Some heartburn generally well-controlled with pepcid though did have spicy food yesterday and bad reflux in the night,    Objective   Physical Exam  Vitals:    24 0835   BP: 103/64      Weight: 110 kg (243 lb), Pregravid BMI: 46.53  Expected Total Weight Gain: 5 kg (11 lb)-9 kg (19 lb)   Fundal height and FHR per prenatal flowsheet    Assessment/Plan     Katy Roldan is a 24 y.o.  at 29w3d with a working estimated date of delivery of 2024, by Est. Date of Conception who presents for a routine prenatal visit.    Tdap today. Fetal echo for incomplete heart views WNL. Growth for class 3 obesity scheduled. Planning weekly NSTs at 34 weeks. Planning to breast feed, has pump. [facility] for peds. Has PCOS and does not have regular menses. Conceived with IUI and ovulation induction. Reviewed contraception options and need for endometrial protection. Discussed endometrial protection with either POPs or condoms with withdrawal bleed. Having GERD well-controlled with pepcid though did have bad reflux after eating spicy food yesterday. Declines PPI now but will let office know if she would like Rx before her next visit. To continue to address. Remainder of plan per problem list as below.     Problem List Items Addressed This Visit       Prenatal care, antepartum ([facility])    Overview     -s/p flu shot, counseled on COVID booster  -Last pap (2022): NIL  -Declines cfDNA and NT, already had carrier testing with [facility]  -s/p tdap  -Breast/[facility] for peds/POPs vs condoms + provera withdrawal for contraception  [ ] Growth at 30 and 36wga, NSTs at 34wga, delivery in 39th week  [ ] TSH q trimester    Next Visit  [ ] Review growth  [ ] Review birth preferences         Pregnancy with  history of infertility in first trimester ([facility])    Overview     -h/o RPL and PCOS  -Conceived with IUI and clomid/letrozole combo  -APLS labs notable for isolated elevated DRVVT ratio that was normal on recheck (NOT meeting criteria for APLS)  -Metformin for PCOS, stopped at first PNV  -Early hgbA1c 5.0%         Obesity, Class III, BMI 40-49.9 (morbid obesity) (Multi)    Overview     -Aware of BMI restrictions at [facility] (256 lb = BMI 50)  -For growth at 30 and 36wga, NSTs at 34wga, and delivery in 39th week         Gastroesophageal reflux disease without esophagitis    Overview     -On Pepcid          Other Visit Diagnoses       29 weeks gestation of pregnancy ([facility])    -  Primary    Need for vaccination        Relevant Orders    Tdap vaccine, age 7 years and older  (BOOSTRIX) (Completed)          Follow up in 2 weeks for a routine prenatal visit.    Linda Dawkins MD

## 2024-07-24 ENCOUNTER — HOSPITAL ENCOUNTER (OUTPATIENT)
Dept: RADIOLOGY | Facility: CLINIC | Age: 24
Discharge: HOME | End: 2024-07-24
Payer: COMMERCIAL

## 2024-07-24 DIAGNOSIS — Z34.90 PRENATAL CARE, ANTEPARTUM (HHS-HCC): ICD-10-CM

## 2024-07-24 PROCEDURE — 76819 FETAL BIOPHYS PROFIL W/O NST: CPT | Performed by: OBSTETRICS & GYNECOLOGY

## 2024-07-24 PROCEDURE — 76816 OB US FOLLOW-UP PER FETUS: CPT

## 2024-07-24 PROCEDURE — 76819 FETAL BIOPHYS PROFIL W/O NST: CPT

## 2024-07-24 PROCEDURE — 76816 OB US FOLLOW-UP PER FETUS: CPT | Performed by: OBSTETRICS & GYNECOLOGY

## 2024-08-01 ENCOUNTER — APPOINTMENT (OUTPATIENT)
Dept: OBSTETRICS AND GYNECOLOGY | Facility: CLINIC | Age: 24
End: 2024-08-01
Payer: COMMERCIAL

## 2024-08-01 VITALS — WEIGHT: 243.8 LBS | SYSTOLIC BLOOD PRESSURE: 114 MMHG | DIASTOLIC BLOOD PRESSURE: 76 MMHG | BODY MASS INDEX: 47.86 KG/M2

## 2024-08-01 DIAGNOSIS — Z3A.31 31 WEEKS GESTATION OF PREGNANCY (HHS-HCC): Primary | ICD-10-CM

## 2024-08-01 DIAGNOSIS — Z34.90 PRENATAL CARE, ANTEPARTUM (HHS-HCC): ICD-10-CM

## 2024-08-01 DIAGNOSIS — E66.01 OBESITY, CLASS III, BMI 40-49.9 (MORBID OBESITY) (MULTI): ICD-10-CM

## 2024-08-01 DIAGNOSIS — E03.9 ACQUIRED HYPOTHYROIDISM: ICD-10-CM

## 2024-08-01 RX ORDER — FAMOTIDINE 10 MG/1
10 TABLET ORAL DAILY
COMMUNITY

## 2024-08-01 NOTE — PROGRESS NOTES
Subjective   Patient ID [id number]   Katy Roldan is a 24 y.o.  at 31w3d with a working estimated date of delivery of 2024, by Est. Date of Conception who presents for a routine prenatal visit. Good FM, no OB complaints.    Objective   Physical Exam  Vitals:    24 0953   BP: 114/76      Weight: 111 kg (243 lb 12.8 oz), Pregravid BMI: 46.53  Expected Total Weight Gain: 5 kg (11 lb)-9 kg (19 lb)   Fundal height and FHR per prenatal flowsheet    Assessment/Plan     Katy Roldan is a 24 y.o.  at 31w3d with a working estimated date of delivery of 2024, by Est. Date of Conception who presents for a routine prenatal visit.    Growth with EFW 85%ile, AC 90%ile. Has 36 week scan scheduled. To start weekly NSTs at 34 weeks for class 3 obesity which were scheduled today. Reviewed birth preferences - amenable to 39 week IOL. Hypothyroid on synthroid 150mcg daily, to recheck TSH next visit. Remainder of plan per problem list as below.     Problem List Items Addressed This Visit       Prenatal care, antepartum ([facility])    Overview     -s/p flu shot, counseled on COVID booster  -Last pap (2022): NIL  -Declines cfDNA and NT, already had carrier testing with [facility]  -s/p tdap  -Breast/[facility] for peds/POPs vs condoms + provera withdrawal for contraception  -Last growth US (): EFW 85%, AC 90%, HC 77%  [ ] Growth at 30 and 36wga, NSTs at 34wga, delivery in 39th week    Next Visit  [ ] TSH          Obesity, Class III, BMI 40-49.9 (morbid obesity) (Multi)    Overview     -Aware of BMI restrictions at [facility] (256 lb = BMI 50)  -For growth at 30 and 36wga, NSTs at 34wga, and delivery in 39th week         Acquired hypothyroidism    Overview     -TSH  () -> synthroid increased to 137mcg daily  -TSH 2.67 () -> synthroid increased to 150mcg daily  -TSH 0.57 (3/22)  -TSH 1.2 ()  -For q trimester TSH          Other Visit Diagnoses       31 weeks gestation of pregnancy ([facility])    -   Primary          Follow up in 2 weeks for a routine prenatal visit.    Linda Dawkins MD

## 2024-08-15 ENCOUNTER — APPOINTMENT (OUTPATIENT)
Dept: OBSTETRICS AND GYNECOLOGY | Facility: CLINIC | Age: 24
End: 2024-08-15
Payer: COMMERCIAL

## 2024-08-15 VITALS — BODY MASS INDEX: 48.3 KG/M2 | DIASTOLIC BLOOD PRESSURE: 75 MMHG | WEIGHT: 246 LBS | SYSTOLIC BLOOD PRESSURE: 108 MMHG

## 2024-08-15 DIAGNOSIS — Z34.90 PRENATAL CARE, ANTEPARTUM (HHS-HCC): ICD-10-CM

## 2024-08-15 DIAGNOSIS — E03.9 HYPOTHYROIDISM, UNSPECIFIED TYPE: ICD-10-CM

## 2024-08-15 DIAGNOSIS — E66.01 OBESITY, CLASS III, BMI 40-49.9 (MORBID OBESITY) (MULTI): ICD-10-CM

## 2024-08-15 DIAGNOSIS — E03.9 ACQUIRED HYPOTHYROIDISM: Primary | ICD-10-CM

## 2024-08-15 DIAGNOSIS — Z3A.33 33 WEEKS GESTATION OF PREGNANCY (HHS-HCC): ICD-10-CM

## 2024-08-15 LAB — TSH SERPL-ACNC: 2.13 MIU/L (ref 0.44–3.98)

## 2024-08-15 PROCEDURE — 0501F PRENATAL FLOW SHEET: CPT | Performed by: STUDENT IN AN ORGANIZED HEALTH CARE EDUCATION/TRAINING PROGRAM

## 2024-08-15 PROCEDURE — 84443 ASSAY THYROID STIM HORMONE: CPT

## 2024-08-15 NOTE — PROGRESS NOTES
Subjective   Patient ID [id number]   Katy Roldan is a 24 y.o.  at 33w3d with a working estimated date of delivery of 2024, by Est. Date of Conception who presents for a routine prenatal visit. Good FM, no OB complaints. Having constipation    Objective   Physical Exam  Vitals:    08/15/24 0825   BP: 108/75      Weight: 112 kg (246 lb), Pregravid BMI: 46.53  Expected Total Weight Gain: 5 kg (11 lb)-9 kg (19 lb)   Fundal height and FHR per prenatal flowsheet    Assessment/Plan     Katy Roldan is a 24 y.o.  at 33w3d with a working estimated date of delivery of 2024, by Est. Date of Conception who presents for a routine prenatal visit.    To start miralax for constipation. Class 3 obesity - weekly NSTs to start next week. Has 36 week growth scheduled. Hypothyroid on synthroid 150mcg daily. To check TSH today. Remainder of plan per problem list as below.     Problem List Items Addressed This Visit       Prenatal care, antepartum ([facility])    Overview     -s/p flu shot, counseled on COVID booster  -Last pap (2022): NIL  -Declines cfDNA and NT, already had carrier testing with [facility]  -s/p tdap  -Breast/[facility] for peds/POPs vs condoms + provera withdrawal for contraception  -Last growth US (): EFW 85%, AC 90%, HC 77%  [ ] Growth at 30 and 36wga, NSTs at 34wga, delivery in 39th week    Next Visit  [ ] NST         Obesity, Class III, BMI 40-49.9 (morbid obesity) (Multi)    Overview     -Aware of BMI restrictions at [facility] (256 lb = BMI 50)  -For growth at 30 and 36wga, NSTs at 34wga, and delivery in 39th week         Acquired hypothyroidism - Primary    Overview     -TSH  () -> synthroid increased to 137mcg daily  -TSH 2.67 () -> synthroid increased to 150mcg daily  -TSH 0.57 (3/22)  -TSH 1.2 ()  -For q trimester TSH          Other Visit Diagnoses       Hypothyroidism, unspecified type        Relevant Orders    TSH with reflex to Free T4 if abnormal    33 weeks gestation  of pregnancy ([facility])              Follow up in 1 weeks for a routine prenatal visit.    Linda Dawkins MD

## 2024-08-21 ENCOUNTER — APPOINTMENT (OUTPATIENT)
Dept: OBSTETRICS AND GYNECOLOGY | Facility: CLINIC | Age: 24
End: 2024-08-21
Payer: COMMERCIAL

## 2024-08-22 ENCOUNTER — APPOINTMENT (OUTPATIENT)
Dept: OBSTETRICS AND GYNECOLOGY | Facility: CLINIC | Age: 24
End: 2024-08-22
Payer: COMMERCIAL

## 2024-08-22 VITALS
SYSTOLIC BLOOD PRESSURE: 114 MMHG | DIASTOLIC BLOOD PRESSURE: 68 MMHG | WEIGHT: 250.13 LBS | BODY MASS INDEX: 49.11 KG/M2

## 2024-08-22 DIAGNOSIS — E66.01 OBESITY, CLASS III, BMI 40-49.9 (MORBID OBESITY) (MULTI): ICD-10-CM

## 2024-08-22 DIAGNOSIS — E66.9 OBESITY, CLASS I, BMI 30-34.9: ICD-10-CM

## 2024-08-22 DIAGNOSIS — Z3A.34 34 WEEKS GESTATION OF PREGNANCY (HHS-HCC): ICD-10-CM

## 2024-08-22 DIAGNOSIS — Z34.90 PRENATAL CARE, ANTEPARTUM (HHS-HCC): Primary | ICD-10-CM

## 2024-08-22 PROCEDURE — 59025 FETAL NON-STRESS TEST: CPT | Performed by: STUDENT IN AN ORGANIZED HEALTH CARE EDUCATION/TRAINING PROGRAM

## 2024-08-22 PROCEDURE — 0501F PRENATAL FLOW SHEET: CPT | Performed by: STUDENT IN AN ORGANIZED HEALTH CARE EDUCATION/TRAINING PROGRAM

## 2024-08-22 NOTE — PROGRESS NOTES
Subjective   Patient ID [id number]   Katy Roldan is a 24 y.o.  at 34w3d with a working estimated date of delivery of 2024, by Est. Date of Conception who presents for a routine prenatal visit. Good FM, no OB complaints.    Objective   Physical Exam  Vitals:    24 1500   BP: 114/68      Weight: 113 kg (250 lb 2 oz), Pregravid BMI: 46.53  Expected Total Weight Gain: 5 kg (11 lb)-9 kg (19 lb)   Fundal height and FHR per prenatal flowsheet    Assessment/Plan     Katy Roldan is a 24 y.o.  at 34w3d with a working estimated date of delivery of 2024, by Est. Date of Conception who presents for a routine prenatal visit.    Class 3 obesity with BMI 49 today. Has gained 4 lb since last visit. Will hit BMI 50 if she gains another 6 lb. Aware of BMI restrictions at [facility]. NST today personally reviewed and interpreted by me - reactive. Has 36 week growth scheduled. Remainder of plan per problem list as below.     Problem List Items Addressed This Visit       Prenatal care, antepartum ([facility]) - Primary    Overview     -s/p flu shot, counseled on COVID booster  -Last pap (2022): NIL  -Declines cfDNA and NT, already had carrier testing with [facility]  -s/p tdap  -Breast/[facility] for peds/POPs vs condoms + provera withdrawal for contraception  -Last growth US (): EFW 85%, AC 90%, HC 77%  [ ] Growth at 30 and 36wga, NSTs at 34wga, delivery in 39th week    Next Visit  [ ] NST  [ ] Review weight  [ ] Discuss GBS next visit  [ ] Discuss RSV         Obesity, Class III, BMI 40-49.9 (morbid obesity) (Multi)    Overview     -Aware of BMI restrictions at [facility] (256 lb = BMI 50)  -For growth at 30 and 36wga, NSTs at 34wga, and delivery in 39th week         Relevant Orders    Fetal nonstress test (Completed)     Other Visit Diagnoses       Obesity, Class I, BMI 30-34.9              Follow up in 1 weeks for a routine prenatal visit.    Linda Dawkins MD

## 2024-08-22 NOTE — PROCEDURES
Katy Roldan, a  at 34w3d with an ZACHARY of 2024, by Est. Date of Conception, was seen at [facility] for a nonstress test.    Non-Stress Test   Baseline Fetal Heart Rate for Non-Stress Test: 130 BPM  Variability in Waveform for Non-Stress Test: Moderate  Accelerations in Non-Stress Test: Yes  Decelerations in Non-Stress Test: None  Contractions in Non-Stress Test: Not present  Acoustic Stimulator for Non-Stress Test: No  Interpretation of Non-Stress Test   Interpretation of Non-Stress Test: Reactive         Linda Dawkins MD

## 2024-08-29 ENCOUNTER — APPOINTMENT (OUTPATIENT)
Dept: OBSTETRICS AND GYNECOLOGY | Facility: CLINIC | Age: 24
End: 2024-08-29
Payer: COMMERCIAL

## 2024-08-29 VITALS — DIASTOLIC BLOOD PRESSURE: 74 MMHG | WEIGHT: 248 LBS | SYSTOLIC BLOOD PRESSURE: 109 MMHG | BODY MASS INDEX: 48.69 KG/M2

## 2024-08-29 DIAGNOSIS — Z23 NEED FOR VACCINATION: ICD-10-CM

## 2024-08-29 DIAGNOSIS — E66.01 OBESITY, CLASS III, BMI 40-49.9 (MORBID OBESITY) (MULTI): ICD-10-CM

## 2024-08-29 DIAGNOSIS — Z3A.35 35 WEEKS GESTATION OF PREGNANCY (HHS-HCC): Primary | ICD-10-CM

## 2024-08-29 NOTE — PROGRESS NOTES
Subjective   Patient ID [id number]   Katy Roldan is a 24 y.o.  at 35w3d with a working estimated date of delivery of 2024, by Est. Date of Conception who presents for a routine prenatal visit. Good FM, no OB complaints.    Objective   Physical Exam  Vitals:    24 1555   BP: 109/74      Weight: 112 kg (248 lb), Pregravid BMI: 46.53  Expected Total Weight Gain: 5 kg (11 lb)-9 kg (19 lb)   Fundal height and FHR per prenatal flowsheet    Assessment/Plan     Katy Roldan is a 24 y.o.  at 35w3d with a working estimated date of delivery of 2024, by Est. Date of Conception who presents for a routine prenatal visit.    Class 3 obesity - NST personally reviewed and interpreted by me, reactive. Has growth next week. To schedule for 39 week IOL next visit. Flu shot today, RSV next visit.  Remainder of plan per problem list as below.     Problem List Items Addressed This Visit       Obesity, Class III, BMI 40-49.9 (morbid obesity) (Multi)    Overview     -Aware of BMI restrictions at [facility] (256 lb = BMI 50)  -For growth at 30 and 36wga, NSTs at 34wga, and delivery in 39th week         35 weeks gestation of pregnancy ([facility]) - Primary    Overview     -Last pap (2022): NIL  -Declines cfDNA and NT, already had carrier testing with [facility]  -s/p tdap  -Breast/[facility] for peds/POPs vs condoms + provera withdrawal for contraception  -Last growth US (): EFW 85%, AC 90%, HC 77%  -s/p flu shot 24  [ ] Growth at 30 and 36wga, NSTs at 34wga, delivery in 39th week    Next Visit  [ ] GBS  [ ] Does not need NST (has growth )  [ ] RSV  [ ] Review birth preferences  [ ] Schedule IOL          Relevant Orders    Fetal nonstress test (Completed)     Other Visit Diagnoses       Need for vaccination        Relevant Orders    Flu vaccine, trivalent, preservative free, age 6 months and greater (Fluraix/Fluzone/Flulaval) (Completed)          Follow up in 1 weeks for a routine prenatal visit.    Linda  MD Mariza

## 2024-08-29 NOTE — PROCEDURES
Katy Roldan, a  at 35w3d with an ZACHARY of 2024, by Est. Date of Conception, was seen at [facility] for a nonstress test.    Non-Stress Test   Baseline Fetal Heart Rate for Non-Stress Test: 150 BPM  Variability in Waveform for Non-Stress Test: Moderate  Accelerations in Non-Stress Test: Yes  Decelerations in Non-Stress Test: None  Contractions in Non-Stress Test: Not present  Acoustic Stimulator for Non-Stress Test: No  Interpretation of Non-Stress Test   Interpretation of Non-Stress Test: Reactive         Linda Dawkins MD

## 2024-09-03 ENCOUNTER — APPOINTMENT (OUTPATIENT)
Dept: OBSTETRICS AND GYNECOLOGY | Facility: CLINIC | Age: 24
End: 2024-09-03
Payer: COMMERCIAL

## 2024-09-03 ENCOUNTER — PREP FOR PROCEDURE (OUTPATIENT)
Dept: OBSTETRICS AND GYNECOLOGY | Facility: HOSPITAL | Age: 24
End: 2024-09-03

## 2024-09-03 VITALS — DIASTOLIC BLOOD PRESSURE: 76 MMHG | SYSTOLIC BLOOD PRESSURE: 116 MMHG | BODY MASS INDEX: 48.85 KG/M2 | WEIGHT: 248.8 LBS

## 2024-09-03 DIAGNOSIS — Z3A.36 36 WEEKS GESTATION OF PREGNANCY (HHS-HCC): Primary | ICD-10-CM

## 2024-09-03 DIAGNOSIS — E66.01 OBESITY, CLASS III, BMI 40-49.9 (MORBID OBESITY) (MULTI): ICD-10-CM

## 2024-09-03 DIAGNOSIS — Z3A.39 39 WEEKS GESTATION OF PREGNANCY (HHS-HCC): Primary | ICD-10-CM

## 2024-09-03 DIAGNOSIS — Z23 NEED FOR VACCINATION: ICD-10-CM

## 2024-09-03 PROCEDURE — 87081 CULTURE SCREEN ONLY: CPT

## 2024-09-03 PROCEDURE — 87077 CULTURE AEROBIC IDENTIFY: CPT

## 2024-09-03 NOTE — PROGRESS NOTES
Subjective   Patient ID [id number]   Katy Roldan is a 24 y.o.  at 36w1d with a working estimated date of delivery of 2024, by Est. Date of Conception who presents for a routine prenatal visit. Good FM, no OB complaints.    Objective   Physical Exam  Vitals:    24 1539   BP: 116/76      Weight: 113 kg (248 lb 12.8 oz), Pregravid BMI: 46.53  Expected Total Weight Gain: 5 kg (11 lb)-9 kg (19 lb)   Fundal height and FHR per prenatal flowsheet    Assessment/Plan     Katy Roldan is a 24 y.o.  at 36w1d with a working estimated date of delivery of 2024, by Est. Date of Conception who presents for a routine prenatal visit.    GBS collected. SVE 0/-3, anterior. RSV today. Has growth this week so NST not indicated. IOL for class 3 obesity scheduled for 39w0d on  at . Did not bring birth preferences today, will review next visit. Remainder of plan per problem list as below.     Problem List Items Addressed This Visit       Obesity, Class III, BMI 40-49.9 (morbid obesity) (Multi)    Overview     -Aware of BMI restrictions at [facility] (256 lb = BMI 50)  -For growth at 30 and 36wga, NSTs at 34wga, and delivery in 39th week         36 weeks gestation of pregnancy ([facility]) - Primary    Overview     -Last pap (2022): NIL  -Declines cfDNA and NT, already had carrier testing with [facility]  -s/p tdap  -Breast/[facility] for peds/POPs vs condoms + provera withdrawal for contraception  -Last growth US (): EFW 85%, AC 90%, HC 77%  -s/p flu shot 24  [ ] Growth at 30 and 36wga, NSTs at 34wga, delivery in 39th week  IOL  at  at 39w0d    Next Visit  [ ] Review birth preferences  [ ] Review growth         Relevant Orders    Group B Streptococcus (GBS) Prenatal Screen, Culture     Other Visit Diagnoses       Need for vaccination        Relevant Orders    Respiratory Syncytial Virus (RSV), Eligible Pregnant Pts, 0.5 mL (ABRYSVO)          Follow up in 1 weeks for a routine  prenatal visit.    Linda Dawkins MD

## 2024-09-05 ENCOUNTER — TELEPHONE (OUTPATIENT)
Dept: OBSTETRICS AND GYNECOLOGY | Facility: HOSPITAL | Age: 24
End: 2024-09-05
Payer: COMMERCIAL

## 2024-09-05 ENCOUNTER — HOSPITAL ENCOUNTER (OUTPATIENT)
Dept: RADIOLOGY | Facility: CLINIC | Age: 24
Discharge: HOME | End: 2024-09-05
Payer: COMMERCIAL

## 2024-09-05 DIAGNOSIS — O99.213 OBESITY AFFECTING PREGNANCY IN THIRD TRIMESTER (HHS-HCC): ICD-10-CM

## 2024-09-05 DIAGNOSIS — O99.283: ICD-10-CM

## 2024-09-05 DIAGNOSIS — E07.9: ICD-10-CM

## 2024-09-05 DIAGNOSIS — Z34.90 PRENATAL CARE, ANTEPARTUM (HHS-HCC): ICD-10-CM

## 2024-09-05 PROBLEM — O32.2XX0 TRANSVERSE LIE OF FETUS (HHS-HCC): Status: ACTIVE | Noted: 2024-09-05

## 2024-09-05 PROCEDURE — 76816 OB US FOLLOW-UP PER FETUS: CPT

## 2024-09-05 PROCEDURE — 76816 OB US FOLLOW-UP PER FETUS: CPT | Performed by: OBSTETRICS & GYNECOLOGY

## 2024-09-05 PROCEDURE — 76819 FETAL BIOPHYS PROFIL W/O NST: CPT

## 2024-09-05 PROCEDURE — 76819 FETAL BIOPHYS PROFIL W/O NST: CPT | Performed by: OBSTETRICS & GYNECOLOGY

## 2024-09-05 NOTE — TELEPHONE ENCOUNTER
Notified by M that fetus is transverse back up on 36 week growth today. Called patient, discussed over the phone. Reviewed options for ECV vs pCS. Factors increasing chance of success include transverse presentation. Factors decreasing chance of success include primiparity, BMI 49, and anterior placenta. To reassess presentation at next visit at 37w1d and determine plan from there. Patient leaning toward pCS if still malpresenting at that visit. All questions answered, comfortable with plan.     Linda Dawkins MD

## 2024-09-06 LAB — GP B STREP GENITAL QL CULT: ABNORMAL

## 2024-09-10 ENCOUNTER — APPOINTMENT (OUTPATIENT)
Dept: OBSTETRICS AND GYNECOLOGY | Facility: CLINIC | Age: 24
End: 2024-09-10
Payer: COMMERCIAL

## 2024-09-10 VITALS — SYSTOLIC BLOOD PRESSURE: 122 MMHG | DIASTOLIC BLOOD PRESSURE: 81 MMHG | BODY MASS INDEX: 49.2 KG/M2 | WEIGHT: 250.6 LBS

## 2024-09-10 DIAGNOSIS — O32.0XX0 UNSTABLE FETAL LIE, SINGLE OR UNSPECIFIED FETUS (HHS-HCC): Primary | ICD-10-CM

## 2024-09-10 DIAGNOSIS — E66.01 OBESITY, CLASS III, BMI 40-49.9 (MORBID OBESITY) (MULTI): ICD-10-CM

## 2024-09-10 DIAGNOSIS — Z3A.37 37 WEEKS GESTATION OF PREGNANCY (HHS-HCC): ICD-10-CM

## 2024-09-10 PROBLEM — O32.2XX0 TRANSVERSE LIE OF FETUS (HHS-HCC): Status: RESOLVED | Noted: 2024-09-05 | Resolved: 2024-09-10

## 2024-09-10 PROCEDURE — 0501F PRENATAL FLOW SHEET: CPT | Performed by: STUDENT IN AN ORGANIZED HEALTH CARE EDUCATION/TRAINING PROGRAM

## 2024-09-10 PROCEDURE — 59025 FETAL NON-STRESS TEST: CPT | Performed by: STUDENT IN AN ORGANIZED HEALTH CARE EDUCATION/TRAINING PROGRAM

## 2024-09-10 NOTE — PROGRESS NOTES
Subjective   Patient ID [id number]   Katy Roldan is a 24 y.o.  at 37w1d with a working estimated date of delivery of 2024, by Est. Date of Conception who presents for a routine prenatal visit. Good FM, no OB complaints. Did Spinningbab[employer] exercises.    Objective   Physical Exam  Vitals:    09/10/24 1552   BP: 122/81      Weight: 114 kg (250 lb 9.6 oz), Pregravid BMI: 46.53  Expected Total Weight Gain: 5 kg (11 lb)-9 kg (19 lb)   Fundal height and FHR per prenatal flowsheet  BSUS: cephalic, LOT    Assessment/Plan     Katy Roldan is a 24 y.o.  at 37w1d with a working estimated date of delivery of 2024, by Est. Date of Conception who presents for a routine prenatal visit.    Class 3 obesity - weight stable with BMI 49. Aware of BMI restrictions at [facility]. NST personally reviewed and interpreted by me, reactive. Transverse on growth scan last week. Cephalic on scan today. Will re-scan next visit to confirm presentation. EFW 79%ile with AC 98%ile on last scan. Extrapolates to 3900 g at 39 weeks. Did discuss risk of shoulder dystocia today including risk of brachial plexus injury, fracture, and HIE. No indication for pCS at this time. Remainder of plan per problem list as below.     Problem List Items Addressed This Visit       Unstable lie of fetus ([facility]) - Primary    Overview     -Transverse back up at 36 weeks, cephalic at 37 weeks  [ ] To re-scan in the office at 38 weeks         37 weeks gestation of pregnancy ([facility])    Overview     -Last pap (2022): NIL  -Declines cfDNA and NT, already had carrier testing with [facility]  -s/p tdap  -Breast/[facility] for peds/POPs vs condoms + provera withdrawal for contraception  -Last growth US (): EFW 3219 g (79%), AC 98%, HC 66%, ASHISH 12.8, anterior placenta, transverse back up  -s/p flu shot 24  [ ] Growth at 30 and 36wga, NSTs at 34wga, delivery in 39th week  IOL  at 2000 at 39w0d    Next Visit  [ ] Review weight  [ ] NST  [ ]  Re-scan for presentation          Follow up in 1 weeks for a routine prenatal visit.    Linda Dawkins MD

## 2024-09-10 NOTE — PROCEDURES
Katy Roldan, a  at 37w1d with an ZACHARY of 2024, by Est. Date of Conception, was seen at [facility] for a nonstress test.    Non-Stress Test   Baseline Fetal Heart Rate for Non-Stress Test: 120 BPM  Variability in Waveform for Non-Stress Test: Moderate  Accelerations in Non-Stress Test: Yes  Decelerations in Non-Stress Test: None  Contractions in Non-Stress Test: Not present  Acoustic Stimulator for Non-Stress Test: No  Interpretation of Non-Stress Test   Interpretation of Non-Stress Test: Reactive      Linda Dawkins MD

## 2024-09-16 ENCOUNTER — APPOINTMENT (OUTPATIENT)
Dept: OBSTETRICS AND GYNECOLOGY | Facility: CLINIC | Age: 24
End: 2024-09-16
Payer: COMMERCIAL

## 2024-09-16 VITALS — BODY MASS INDEX: 49.79 KG/M2 | SYSTOLIC BLOOD PRESSURE: 117 MMHG | DIASTOLIC BLOOD PRESSURE: 76 MMHG | WEIGHT: 253.6 LBS

## 2024-09-16 DIAGNOSIS — O32.0XX0 UNSTABLE FETAL LIE, SINGLE OR UNSPECIFIED FETUS (HHS-HCC): ICD-10-CM

## 2024-09-16 DIAGNOSIS — Z3A.38 38 WEEKS GESTATION OF PREGNANCY (HHS-HCC): Primary | ICD-10-CM

## 2024-09-16 DIAGNOSIS — E66.01 OBESITY, CLASS III, BMI 40-49.9 (MORBID OBESITY) (MULTI): ICD-10-CM

## 2024-09-16 NOTE — PROGRESS NOTES
Subjective   Patient ID [id number]   Katy Roldan is a 24 y.o.  at 38w0d with a working estimated date of delivery of 2024, by Est. Date of Conception who presents for a routine prenatal visit. Good FM, having more cramping.     Objective   Physical Exam  Vitals:    24 1557   BP: 117/76      Weight: 115 kg (253 lb 9.6 oz), Pregravid BMI: 46.53  Expected Total Weight Gain: 5 kg (11 lb)-9 kg (19 lb)   Fundal height and FHR per prenatal flowsheet    Assessment/Plan     Katy Roldan is a 24 y.o.  at 38w0d with a working estimated date of delivery of 2024, by Est. Date of Conception who presents for a routine prenatal visit.    NST for class 3 obesity personally reviewed and interpreted by me - reactive. SVE 0/50/-3. Cephalic re-confirmed by BSUS. Gained 3 lb since last visit. BMI now 49.8. After counseling, desires to reschedule IOL for [facility]. Will assist in scheduling hospital tour at [facility]. Remainder of plan per problem list as below.     Problem List Items Addressed This Visit       Unstable lie of fetus ([facility])    Overview     -Transverse back up at 36 weeks, cephalic at 37 weeks  -Cephalic at 38 weeks         Obesity, Class III, BMI 40-49.9 (morbid obesity) (Multi)    Overview     -Aware of BMI restrictions at [facility] (256 lb = BMI 50)  -For growth at 30 and 36wga, NSTs at 34wga, and delivery in 39th week  -BMI 49.8 at 38wga, to plan for delivery at [facility]         38 weeks gestation of pregnancy ([facility]) - Primary    Overview     -Last pap (2022): NIL  -Declines cfDNA and NT, already had carrier testing with [facility]  -s/p tdap  -[facility] [place] for peds/POPs vs condoms + provera withdrawal for contraception  -Last growth US (): EFW 3219 g (79%), AC 98%, HC 66%, ASHISH 12.8, anterior placenta  -s/p flu shot 24  [ ] Growth at 30 and 36wga, NSTs at 34wga, delivery in 39th week  IOL 9/23 at  at 39w0d -> to reschedule to [facility] for BMI approaching 50          Follow up in 1  weeks for a routine prenatal visit.    Linda Dawkins MD

## 2024-09-16 NOTE — PROCEDURES
Katy Roldan, a  at 38w0d with an ZACHARY of 2024, by Est. Date of Conception, was seen at [facility] for a nonstress test.    Non-Stress Test   Baseline Fetal Heart Rate for Non-Stress Test: 130 BPM  Variability in Waveform for Non-Stress Test: Moderate  Accelerations in Non-Stress Test: Yes  Decelerations in Non-Stress Test: None  Contractions in Non-Stress Test: Not present  Acoustic Stimulator for Non-Stress Test: No  Interpretation of Non-Stress Test   Interpretation of Non-Stress Test: Reactive      Linda Dawkins MD

## 2024-09-18 ENCOUNTER — HOSPITAL ENCOUNTER (OUTPATIENT)
Facility: HOSPITAL | Age: 24
Discharge: HOME | End: 2024-09-18
Attending: STUDENT IN AN ORGANIZED HEALTH CARE EDUCATION/TRAINING PROGRAM | Admitting: STUDENT IN AN ORGANIZED HEALTH CARE EDUCATION/TRAINING PROGRAM
Payer: COMMERCIAL

## 2024-09-18 ENCOUNTER — TELEPHONE (OUTPATIENT)
Dept: OBSTETRICS AND GYNECOLOGY | Facility: HOSPITAL | Age: 24
End: 2024-09-18
Payer: COMMERCIAL

## 2024-09-18 ENCOUNTER — TELEPHONE (OUTPATIENT)
Dept: OBSTETRICS AND GYNECOLOGY | Facility: CLINIC | Age: 24
End: 2024-09-18

## 2024-09-18 VITALS
RESPIRATION RATE: 18 BRPM | OXYGEN SATURATION: 98 % | HEART RATE: 98 BPM | SYSTOLIC BLOOD PRESSURE: 113 MMHG | DIASTOLIC BLOOD PRESSURE: 61 MMHG | TEMPERATURE: 97.7 F

## 2024-09-18 LAB
ALBUMIN SERPL BCP-MCNC: 3.4 G/DL (ref 3.4–5)
ALP SERPL-CCNC: 137 U/L (ref 33–110)
ALT SERPL W P-5'-P-CCNC: 14 U/L (ref 7–45)
ANION GAP SERPL CALC-SCNC: 13 MMOL/L (ref 10–20)
AST SERPL W P-5'-P-CCNC: 13 U/L (ref 9–39)
BILIRUB SERPL-MCNC: 0.3 MG/DL (ref 0–1.2)
BILIRUBIN, POC: NEGATIVE
BLOOD URINE, POC: NEGATIVE
BUN SERPL-MCNC: 5 MG/DL (ref 6–23)
CALCIUM SERPL-MCNC: 8.6 MG/DL (ref 8.6–10.6)
CHLORIDE SERPL-SCNC: 102 MMOL/L (ref 98–107)
CLARITY, POC: CLEAR
CO2 SERPL-SCNC: 23 MMOL/L (ref 21–32)
COLOR, POC: YELLOW
CREAT SERPL-MCNC: 0.47 MG/DL (ref 0.5–1.05)
CREAT UR-MCNC: 162.6 MG/DL (ref 20–320)
EGFRCR SERPLBLD CKD-EPI 2021: >90 ML/MIN/1.73M*2
ERYTHROCYTE [DISTWIDTH] IN BLOOD BY AUTOMATED COUNT: 15.4 % (ref 11.5–14.5)
GLUCOSE SERPL-MCNC: 72 MG/DL (ref 74–99)
GLUCOSE URINE, POC: NEGATIVE
HCT VFR BLD AUTO: 34.5 % (ref 36–46)
HGB BLD-MCNC: 11 G/DL (ref 12–16)
KETONES, POC: NEGATIVE
LEUKOCYTE EST, POC: NEGATIVE
MCH RBC QN AUTO: 27 PG (ref 26–34)
MCHC RBC AUTO-ENTMCNC: 31.9 G/DL (ref 32–36)
MCV RBC AUTO: 85 FL (ref 80–100)
NITRITE, POC: NEGATIVE
NRBC BLD-RTO: 0 /100 WBCS (ref 0–0)
PH, POC: 7
PLATELET # BLD AUTO: 245 X10*3/UL (ref 150–450)
POC APPEARANCE OF BODY FLUID: CLEAR
POTASSIUM SERPL-SCNC: 4.2 MMOL/L (ref 3.5–5.3)
PROT SERPL-MCNC: 6.2 G/DL (ref 6.4–8.2)
PROT UR-ACNC: 25 MG/DL (ref 5–24)
PROT/CREAT UR: 0.15 MG/MG CREAT (ref 0–0.17)
RBC # BLD AUTO: 4.07 X10*6/UL (ref 4–5.2)
SODIUM SERPL-SCNC: 134 MMOL/L (ref 136–145)
SPECIFIC GRAVITY, POC: 1.02
URINE PROTEIN, POC: NORMAL
UROBILINOGEN, POC: 0.2
WBC # BLD AUTO: 8.8 X10*3/UL (ref 4.4–11.3)

## 2024-09-18 PROCEDURE — 59025 FETAL NON-STRESS TEST: CPT

## 2024-09-18 PROCEDURE — 84156 ASSAY OF PROTEIN URINE: CPT

## 2024-09-18 PROCEDURE — 36415 COLL VENOUS BLD VENIPUNCTURE: CPT

## 2024-09-18 PROCEDURE — 99214 OFFICE O/P EST MOD 30 MIN: CPT | Mod: 25

## 2024-09-18 PROCEDURE — 85027 COMPLETE CBC AUTOMATED: CPT

## 2024-09-18 PROCEDURE — 99214 OFFICE O/P EST MOD 30 MIN: CPT

## 2024-09-18 PROCEDURE — 2500000004 HC RX 250 GENERAL PHARMACY W/ HCPCS (ALT 636 FOR OP/ED)

## 2024-09-18 PROCEDURE — 84075 ASSAY ALKALINE PHOSPHATASE: CPT

## 2024-09-18 RX ORDER — ONDANSETRON HYDROCHLORIDE 2 MG/ML
4 INJECTION, SOLUTION INTRAVENOUS EVERY 6 HOURS PRN
Status: DISCONTINUED | OUTPATIENT
Start: 2024-09-18 | End: 2024-09-18 | Stop reason: HOSPADM

## 2024-09-18 RX ORDER — GUAIFENESIN 600 MG/1
1200 TABLET, EXTENDED RELEASE ORAL 2 TIMES DAILY PRN
Status: DISCONTINUED | OUTPATIENT
Start: 2024-09-18 | End: 2024-09-18 | Stop reason: HOSPADM

## 2024-09-18 RX ORDER — ONDANSETRON 4 MG/1
4 TABLET, FILM COATED ORAL EVERY 6 HOURS PRN
Status: DISCONTINUED | OUTPATIENT
Start: 2024-09-18 | End: 2024-09-18 | Stop reason: HOSPADM

## 2024-09-18 SDOH — SOCIAL STABILITY: SOCIAL INSECURITY: DOES ANYONE TRY TO KEEP YOU FROM HAVING/CONTACTING OTHER FRIENDS OR DOING THINGS OUTSIDE YOUR HOME?: NO

## 2024-09-18 SDOH — ECONOMIC STABILITY: HOUSING INSECURITY: DO YOU FEEL UNSAFE GOING BACK TO THE PLACE WHERE YOU ARE LIVING?: NO

## 2024-09-18 SDOH — HEALTH STABILITY: MENTAL HEALTH: SUICIDAL BEHAVIOR (LIFETIME): NO

## 2024-09-18 SDOH — SOCIAL STABILITY: SOCIAL INSECURITY: DO YOU FEEL ANYONE HAS EXPLOITED OR TAKEN ADVANTAGE OF YOU FINANCIALLY OR OF YOUR PERSONAL PROPERTY?: NO

## 2024-09-18 SDOH — SOCIAL STABILITY: SOCIAL INSECURITY: HAS ANYONE EVER THREATENED TO HURT YOUR FAMILY OR YOUR PETS?: NO

## 2024-09-18 SDOH — HEALTH STABILITY: MENTAL HEALTH: HAVE YOU USED ANY PRESCRIPTION DRUGS OTHER THAN PRESCRIBED IN THE PAST 12 MONTHS?: NO

## 2024-09-18 SDOH — SOCIAL STABILITY: SOCIAL INSECURITY: ARE YOU OR HAVE YOU BEEN THREATENED OR ABUSED PHYSICALLY, EMOTIONALLY, OR SEXUALLY BY ANYONE?: NO

## 2024-09-18 SDOH — SOCIAL STABILITY: SOCIAL INSECURITY: PHYSICAL ABUSE: DENIES

## 2024-09-18 SDOH — SOCIAL STABILITY: SOCIAL INSECURITY: ARE THERE ANY APPARENT SIGNS OF INJURIES/BEHAVIORS THAT COULD BE RELATED TO ABUSE/NEGLECT?: NO

## 2024-09-18 SDOH — SOCIAL STABILITY: SOCIAL INSECURITY: VERBAL ABUSE: DENIES

## 2024-09-18 SDOH — HEALTH STABILITY: MENTAL HEALTH: NON-SPECIFIC ACTIVE SUICIDAL THOUGHTS (PAST 1 MONTH): NO

## 2024-09-18 SDOH — SOCIAL STABILITY: SOCIAL INSECURITY: HAVE YOU HAD ANY THOUGHTS OF HARMING ANYONE ELSE?: NO

## 2024-09-18 SDOH — HEALTH STABILITY: MENTAL HEALTH: WISH TO BE DEAD (PAST 1 MONTH): NO

## 2024-09-18 SDOH — SOCIAL STABILITY: SOCIAL INSECURITY: HAVE YOU HAD THOUGHTS OF HARMING ANYONE ELSE?: NO

## 2024-09-18 SDOH — SOCIAL STABILITY: SOCIAL INSECURITY: ABUSE SCREEN: ADULT

## 2024-09-18 SDOH — HEALTH STABILITY: MENTAL HEALTH: WERE YOU ABLE TO COMPLETE ALL THE BEHAVIORAL HEALTH SCREENINGS?: YES

## 2024-09-18 SDOH — HEALTH STABILITY: MENTAL HEALTH: HAVE YOU USED ANY SUBSTANCES (CANABIS, COCAINE, HEROIN, HALLUCINOGENS, INHALANTS, ETC.) IN THE PAST 12 MONTHS?: NO

## 2024-09-18 ASSESSMENT — PAIN SCALES - GENERAL
PAINLEVEL_OUTOF10: 0 - NO PAIN
PAINLEVEL_OUTOF10: 0 - NO PAIN

## 2024-09-18 ASSESSMENT — PATIENT HEALTH QUESTIONNAIRE - PHQ9
SUM OF ALL RESPONSES TO PHQ9 QUESTIONS 1 & 2: 0
2. FEELING DOWN, DEPRESSED OR HOPELESS: NOT AT ALL
1. LITTLE INTEREST OR PLEASURE IN DOING THINGS: NOT AT ALL

## 2024-09-18 ASSESSMENT — LIFESTYLE VARIABLES
SKIP TO QUESTIONS 9-10: 1
AUDIT-C TOTAL SCORE: 0
HOW MANY STANDARD DRINKS CONTAINING ALCOHOL DO YOU HAVE ON A TYPICAL DAY: PATIENT DOES NOT DRINK
HOW OFTEN DO YOU HAVE 6 OR MORE DRINKS ON ONE OCCASION: NEVER
AUDIT-C TOTAL SCORE: 0
HOW OFTEN DO YOU HAVE A DRINK CONTAINING ALCOHOL: NEVER

## 2024-09-18 NOTE — H&P
Note Type: Triage H&P    Katy Roldan is a 24 y.o. 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 Rosas.  Patient safe and stable for d/c home.    SAÚL [facility]olson[facility]      Medical Problems       Problem List       Acquired hypothyroidism    Overview Addendum 8/15/2024  3:23 PM by Linda Dawkins MD     -TSH 5/06 (1/26) -> synthroid increased to 137mcg daily  -TSH 2.67 (2/20) -> synthroid increased to 150mcg daily  -TSH 0.57 (3/22)  -TSH 1.2 (6/21)  -TSH 2.13 (8/15)         Anxiety and depression    Overview Addendum 4/22/2024  2:56 PM by Linda Dawkins MD     -On sertraline 50mg daily         Exercise-induced bronchospasm ([facility]HS-HCC)    NAFL (nonalcoholic fatty liver)    Overview Addendum 2/21/2024  3:49 PM by Linda Dawkins MD     -History of mild transaminitis (LFTs in 50s) with hepatic steatosis noted on liver US in 2022  -LFTs WNL in first trimester         Vitamin D deficiency    Pregnancy with history of infertility in first trimester (HHS-HCC)    Overview Addendum 2/21/2024  3:47 PM by Linda Dawkins MD     -h/o RPL and PCOS  -Conceived with IUI and clomid/letrozole combo  -APLS labs notable for isolated elevated DRVVT ratio that was normal on recheck (NOT meeting criteria for APLS)  -Metformin for PCOS, stopped at first PNV  -Early hgbA1c 5.0%         Obesity, Class III, BMI 40-49.9 (morbid obesity) (Multi)    Overview Addendum 9/16/2024   5:04 PM by Linda Dawkins MD     -Aware of BMI restrictions at [facility] (256 lb = BMI 50)  -For growth at 30 and 36wga, NSTs at 34wga, and delivery in 39th week  -BMI 49.8 at 38wga, to plan for delivery at [facility]         38 weeks gestation of pregnancy ([facility])    Overview Addendum 2024  2:28 PM by Linda Dawkins MD     -Last pap (2022): NIL  -Declines cfDNA and NT, already had carrier testing with [facility]  -s/p tdap  -Breast/ [place] for peds/POPs vs condoms + provera withdrawal for contraception  -Last growth US (): EFW 3219 g (79%), AC 98%, HC 66%, ASHISH 12.8, anterior placenta  -s/p flu shot 24  [ ] Growth at 30 and 36wga, NSTs at 34wga, delivery in 39th week  IOL  at 2000 at 39w0d at [facility]         Gastroesophageal reflux disease without esophagitis    Overview Signed 2024  9:08 AM by Linda Dawkins MD     -On Pepcid         Unstable lie of fetus ([facility])    Overview Addendum 2024  5:00 PM by Linda Dawkins MD     -Transverse back up at 36 weeks, cephalic at 37 weeks  -Cephalic at 38 weeks                Freida Vaz is a 24 y.o. year old at 38w2d who presents to triage for an elevated blood pressure at work.  States she has had more swelling than normal and took her blood pressure.  Was mild range (150/86).  Denies hypertension history during pregnancy or outside of pregnancy.  Denies HA, vision changes, CP, SOB, or RUQ pain.  Denies VB, LOF, ctx, and reports good fetal movement.     OB History          3    Para        Term                AB   2    Living             SAB   2    IAB        Ectopic        Multiple        Live Births                      Past Surgical History:   Procedure Laterality Date    OTHER SURGICAL HISTORY  2022    [place] tooth extraction        Social History     Socioeconomic History    Marital status:      Spouse name: Heath Vaz    Number of children: Not on file    Years of education: Not on file     Highest education level: Not on file   Occupational History    Not on file   Tobacco Use    Smoking status: Never    Smokeless tobacco: Never   Vaping Use    Vaping status: Never Used   Substance and Sexual Activity    Alcohol use: Not Currently    Drug use: Never    Sexual activity: Yes     Partners: Male     Birth control/protection: None   Other Topics Concern    Not on file   Social History Narrative    Not on file     Social Determinants of Health     Financial Resource Strain: Low Risk  (1/10/2023)    Received from [facility]    Overall Financial Resource Strain (CARDIA)     Difficulty of Paying Living Expenses: Not hard at all   Food Insecurity: No Food Insecurity (1/10/2023)    Received from [facility]    Hunger Vital Sign     Worried About Running Out of Food in the Last Year: Never true     Ran Out of Food in the Last Year: Never true   Transportation Needs: No Transportation Needs (1/10/2023)    Received from [facility]    PRAPARE - Transportation     Lack of Transportation (Medical): No     Lack of Transportation (Non-Medical): No   Physical Activity: Inactive (1/10/2023)    Received from [facility]    Exercise Vital Sign     Days of Exercise per Week: 0 days     Minutes of Exercise per Session: 0 min   Stress: No Stress Concern Present (1/10/2023)    Received from [facility]    [employer] - Occupational Stress Questionnaire     Feeling of Stress : Only a little   Social Connections: Moderately Integrated (1/10/2023)    Received from [facility]    Social Connection and Isolation Panel [NHANES]     Frequency of Communication with Friends and Family: More than three times a week     Frequency of Social Gatherings with Friends and Family: Once a week     Attends Confucianism Services: 1 to 4 times per year     Active Member of Clubs or  Organizations: No     Attends Club or Organization Meetings: Never     Marital Status:    Intimate Partner Violence: Not on file        Allergies   Allergen Reactions    Montelukast Other     Rapid heart beat    Cefuroxime Rash    Cetirizine Rash        No medications prior to admission.        Objective     Visit Vitals  /61   Pulse 98   Temp 36.5 °C (97.7 °F) (Temporal)   Resp 18        Physical Exam  Physical Exam  Exam conducted with a chaperone present.   Constitutional:       Appearance: Normal appearance.   HENT:      Head: Normocephalic.   Cardiovascular:      Rate and Rhythm: Normal rate.   Pulmonary:      Effort: Pulmonary effort is normal.   Abdominal:      Comments: Gravid   Genitourinary:     Comments: FHT: 130, mod variability, +accels, -decels   [place]: irregular ctx noted  Musculoskeletal:         General: Normal range of motion.   Skin:     General: Skin is warm.   Neurological:      Mental Status: She is alert and oriented to person, place, and time.   Psychiatric:         Mood and Affect: Mood normal.         Behavior: Behavior normal.        Labs  Labs in chart were reviewed.  CBC   Recent Labs     09/18/24  1257   WBC 8.8   HGB 11.0*   HCT 34.5*        CMP   Recent Labs     09/18/24  1257   *   K 4.2      CO2 23   ANIONGAP 13   BUN 5*   CREATININE 0.47*   EGFR >90   CALCIUM 8.6   ALBUMIN 3.4   PROT 6.2*   ALKPHOS 137*   ALT 14   AST 13   BILITOT 0.3     PCR   Recent Labs     09/18/24  1406   TPUC 25*   CREATU 162.6   UTPCR 0.15

## 2024-09-18 NOTE — TELEPHONE ENCOUNTER
Patient called back, BP at work 150/85, denies headache, blurry vision or RUQ pain.  IOL scheduled for 9/23 at [facility] due to BMI.  Still having swelling concerns.       Per Dr Rubio and Dr Gloria patient advised to go to [facility] ob triage for evaluation.  Patient states her swelling seems to be better than it was this morning and forgot to mention that she has been taking sudafed for the last couple of days on and off.  Patient advised that for reassurance she should be seen in ob triage.  They will be steph to run labs on her, put her on NST and do serial BP's.  Patient agreed, is about 1 hour away, will make her way in within the next 1-2 hours.    Called [facility] ob triage, spoke with Saskia, to make them aware.      ----- Message -----  From: Candelaria Mills MD  Sent: 9/18/2024   6:05 AM EDT  To: Rosalee Tineo MA    I talked to this patient of joselito this AM who woke up with some face and hand swelling.  She was seen in the office 2 days ago with a normal BP.  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  Just want her on your radar.  I think if elevated at school, could bring her to office for recheck.  Her IOL is at [facility] next week 2/2 to BMI (currently 49.8).

## 2024-09-18 NOTE — TELEPHONE ENCOUNTER
Patient called this AM after waking up this AM and noticing her face and hands feel more swollen.  Was seen in the office on Monday of this week with normal bp.  Denies HA, blurry vision, scotomata, RUQ/epigastric pain.  Good FM.  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  Discussed if >140/90 (either number) needs to call back for evaluation.      Candelaria Mills MD

## 2024-09-23 ENCOUNTER — ANESTHESIA (OUTPATIENT)
Dept: OBSTETRICS AND GYNECOLOGY | Facility: HOSPITAL | Age: 24
End: 2024-09-23
Payer: COMMERCIAL

## 2024-09-23 ENCOUNTER — APPOINTMENT (OUTPATIENT)
Dept: OBSTETRICS AND GYNECOLOGY | Facility: HOSPITAL | Age: 24
End: 2024-09-23
Payer: COMMERCIAL

## 2024-09-23 ENCOUNTER — ANESTHESIA EVENT (OUTPATIENT)
Dept: OBSTETRICS AND GYNECOLOGY | Facility: HOSPITAL | Age: 24
End: 2024-09-23
Payer: COMMERCIAL

## 2024-09-23 ENCOUNTER — HOSPITAL ENCOUNTER (INPATIENT)
Facility: HOSPITAL | Age: 24
LOS: 4 days | Discharge: HOME | End: 2024-09-27
Attending: OBSTETRICS & GYNECOLOGY | Admitting: OBSTETRICS & GYNECOLOGY
Payer: COMMERCIAL

## 2024-09-23 DIAGNOSIS — Z3A.39 39 WEEKS GESTATION OF PREGNANCY (HHS-HCC): ICD-10-CM

## 2024-09-23 DIAGNOSIS — Z34.90 ENCOUNTER FOR INDUCTION OF LABOR: Primary | ICD-10-CM

## 2024-09-23 DIAGNOSIS — E03.8 OTHER SPECIFIED HYPOTHYROIDISM: ICD-10-CM

## 2024-09-23 PROBLEM — E66.9 OBESITY: Status: ACTIVE | Noted: 2024-09-23

## 2024-09-23 LAB
ABO GROUP (TYPE) IN BLOOD: NORMAL
ANTIBODY SCREEN: NORMAL
ERYTHROCYTE [DISTWIDTH] IN BLOOD BY AUTOMATED COUNT: 14.9 % (ref 11.5–14.5)
HCT VFR BLD AUTO: 35.7 % (ref 36–46)
HGB BLD-MCNC: 11.8 G/DL (ref 12–16)
MCH RBC QN AUTO: 27.1 PG (ref 26–34)
MCHC RBC AUTO-ENTMCNC: 33.1 G/DL (ref 32–36)
MCV RBC AUTO: 82 FL (ref 80–100)
NRBC BLD-RTO: 0 /100 WBCS (ref 0–0)
PLATELET # BLD AUTO: 295 X10*3/UL (ref 150–450)
RBC # BLD AUTO: 4.35 X10*6/UL (ref 4–5.2)
RH FACTOR (ANTIGEN D): NORMAL
TREPONEMA PALLIDUM IGG+IGM AB [PRESENCE] IN SERUM OR PLASMA BY IMMUNOASSAY: NONREACTIVE
WBC # BLD AUTO: 9.4 X10*3/UL (ref 4.4–11.3)

## 2024-09-23 PROCEDURE — 2500000004 HC RX 250 GENERAL PHARMACY W/ HCPCS (ALT 636 FOR OP/ED): Performed by: STUDENT IN AN ORGANIZED HEALTH CARE EDUCATION/TRAINING PROGRAM

## 2024-09-23 PROCEDURE — 2500000002 HC RX 250 W HCPCS SELF ADMINISTERED DRUGS (ALT 637 FOR MEDICARE OP, ALT 636 FOR OP/ED): Performed by: STUDENT IN AN ORGANIZED HEALTH CARE EDUCATION/TRAINING PROGRAM

## 2024-09-23 PROCEDURE — 2500000001 HC RX 250 WO HCPCS SELF ADMINISTERED DRUGS (ALT 637 FOR MEDICARE OP): Performed by: STUDENT IN AN ORGANIZED HEALTH CARE EDUCATION/TRAINING PROGRAM

## 2024-09-23 PROCEDURE — 86901 BLOOD TYPING SEROLOGIC RH(D): CPT | Performed by: STUDENT IN AN ORGANIZED HEALTH CARE EDUCATION/TRAINING PROGRAM

## 2024-09-23 PROCEDURE — 36415 COLL VENOUS BLD VENIPUNCTURE: CPT | Performed by: STUDENT IN AN ORGANIZED HEALTH CARE EDUCATION/TRAINING PROGRAM

## 2024-09-23 PROCEDURE — 7210000002 HC LABOR PER HOUR

## 2024-09-23 PROCEDURE — 2500000001 HC RX 250 WO HCPCS SELF ADMINISTERED DRUGS (ALT 637 FOR MEDICARE OP)

## 2024-09-23 PROCEDURE — 86780 TREPONEMA PALLIDUM: CPT | Performed by: STUDENT IN AN ORGANIZED HEALTH CARE EDUCATION/TRAINING PROGRAM

## 2024-09-23 PROCEDURE — 3E0P7VZ INTRODUCTION OF HORMONE INTO FEMALE REPRODUCTIVE, VIA NATURAL OR ARTIFICIAL OPENING: ICD-10-PCS | Performed by: OBSTETRICS & GYNECOLOGY

## 2024-09-23 PROCEDURE — 1120000001 HC OB PRIVATE ROOM DAILY

## 2024-09-23 PROCEDURE — 85027 COMPLETE CBC AUTOMATED: CPT | Performed by: STUDENT IN AN ORGANIZED HEALTH CARE EDUCATION/TRAINING PROGRAM

## 2024-09-23 PROCEDURE — 99222 1ST HOSP IP/OBS MODERATE 55: CPT

## 2024-09-23 PROCEDURE — 59050 FETAL MONITOR W/REPORT: CPT

## 2024-09-23 RX ORDER — METOCLOPRAMIDE 10 MG/1
10 TABLET ORAL EVERY 6 HOURS PRN
Status: DISCONTINUED | OUTPATIENT
Start: 2024-09-23 | End: 2024-09-24

## 2024-09-23 RX ORDER — SERTRALINE HYDROCHLORIDE 50 MG/1
50 TABLET, FILM COATED ORAL DAILY
Status: DISCONTINUED | OUTPATIENT
Start: 2024-09-23 | End: 2024-09-24

## 2024-09-23 RX ORDER — SODIUM CHLORIDE, SODIUM LACTATE, POTASSIUM CHLORIDE, CALCIUM CHLORIDE 600; 310; 30; 20 MG/100ML; MG/100ML; MG/100ML; MG/100ML
125 INJECTION, SOLUTION INTRAVENOUS CONTINUOUS
Status: DISCONTINUED | OUTPATIENT
Start: 2024-09-23 | End: 2024-09-24

## 2024-09-23 RX ORDER — OXYTOCIN 10 [USP'U]/ML
10 INJECTION, SOLUTION INTRAMUSCULAR; INTRAVENOUS ONCE AS NEEDED
Status: DISCONTINUED | OUTPATIENT
Start: 2024-09-23 | End: 2024-09-24

## 2024-09-23 RX ORDER — ONDANSETRON 4 MG/1
4 TABLET, FILM COATED ORAL EVERY 6 HOURS PRN
Status: DISCONTINUED | OUTPATIENT
Start: 2024-09-23 | End: 2024-09-24

## 2024-09-23 RX ORDER — ONDANSETRON HYDROCHLORIDE 2 MG/ML
4 INJECTION, SOLUTION INTRAVENOUS EVERY 6 HOURS PRN
Status: DISCONTINUED | OUTPATIENT
Start: 2024-09-23 | End: 2024-09-24

## 2024-09-23 RX ORDER — MISOPROSTOL 200 UG/1
800 TABLET ORAL ONCE AS NEEDED
Status: DISCONTINUED | OUTPATIENT
Start: 2024-09-23 | End: 2024-09-24

## 2024-09-23 RX ORDER — PENICILLIN G 3000000 [IU]/50ML
3 INJECTION, SOLUTION INTRAVENOUS EVERY 4 HOURS
Status: DISCONTINUED | OUTPATIENT
Start: 2024-09-24 | End: 2024-09-24

## 2024-09-23 RX ORDER — TERBUTALINE SULFATE 1 MG/ML
0.25 INJECTION SUBCUTANEOUS ONCE AS NEEDED
Status: DISCONTINUED | OUTPATIENT
Start: 2024-09-23 | End: 2024-09-24

## 2024-09-23 RX ORDER — FAMOTIDINE 20 MG/1
10 TABLET, FILM COATED ORAL NIGHTLY
Status: DISCONTINUED | OUTPATIENT
Start: 2024-09-23 | End: 2024-09-24

## 2024-09-23 RX ORDER — LABETALOL HYDROCHLORIDE 5 MG/ML
20 INJECTION, SOLUTION INTRAVENOUS ONCE AS NEEDED
Status: DISCONTINUED | OUTPATIENT
Start: 2024-09-23 | End: 2024-09-24

## 2024-09-23 RX ORDER — NIFEDIPINE 10 MG/1
10 CAPSULE ORAL ONCE AS NEEDED
Status: DISCONTINUED | OUTPATIENT
Start: 2024-09-23 | End: 2024-09-24

## 2024-09-23 RX ORDER — CARBOPROST TROMETHAMINE 250 UG/ML
250 INJECTION, SOLUTION INTRAMUSCULAR ONCE AS NEEDED
Status: DISCONTINUED | OUTPATIENT
Start: 2024-09-23 | End: 2024-09-24

## 2024-09-23 RX ORDER — OXYTOCIN/0.9 % SODIUM CHLORIDE 30/500 ML
60 PLASTIC BAG, INJECTION (ML) INTRAVENOUS ONCE AS NEEDED
Status: COMPLETED | OUTPATIENT
Start: 2024-09-23 | End: 2024-09-24

## 2024-09-23 RX ORDER — HYDRALAZINE HYDROCHLORIDE 20 MG/ML
5 INJECTION INTRAMUSCULAR; INTRAVENOUS ONCE AS NEEDED
Status: DISCONTINUED | OUTPATIENT
Start: 2024-09-23 | End: 2024-09-24

## 2024-09-23 RX ORDER — LEVOTHYROXINE SODIUM 150 UG/1
150 TABLET ORAL
Status: DISCONTINUED | OUTPATIENT
Start: 2024-09-24 | End: 2024-09-27 | Stop reason: HOSPADM

## 2024-09-23 RX ORDER — LOPERAMIDE HYDROCHLORIDE 2 MG/1
4 CAPSULE ORAL EVERY 2 HOUR PRN
Status: DISCONTINUED | OUTPATIENT
Start: 2024-09-23 | End: 2024-09-24

## 2024-09-23 RX ORDER — TRANEXAMIC ACID 100 MG/ML
1000 INJECTION, SOLUTION INTRAVENOUS ONCE AS NEEDED
Status: DISCONTINUED | OUTPATIENT
Start: 2024-09-23 | End: 2024-09-24

## 2024-09-23 RX ORDER — METHYLERGONOVINE MALEATE 0.2 MG/ML
0.2 INJECTION INTRAVENOUS ONCE AS NEEDED
Status: DISCONTINUED | OUTPATIENT
Start: 2024-09-23 | End: 2024-09-24

## 2024-09-23 RX ORDER — METOCLOPRAMIDE HYDROCHLORIDE 5 MG/ML
10 INJECTION INTRAMUSCULAR; INTRAVENOUS EVERY 6 HOURS PRN
Status: DISCONTINUED | OUTPATIENT
Start: 2024-09-23 | End: 2024-09-24

## 2024-09-23 RX ORDER — LIDOCAINE HYDROCHLORIDE 10 MG/ML
30 INJECTION, SOLUTION INFILTRATION; PERINEURAL ONCE AS NEEDED
Status: DISCONTINUED | OUTPATIENT
Start: 2024-09-23 | End: 2024-09-24

## 2024-09-23 SDOH — SOCIAL STABILITY: SOCIAL INSECURITY: DO YOU FEEL ANYONE HAS EXPLOITED OR TAKEN ADVANTAGE OF YOU FINANCIALLY OR OF YOUR PERSONAL PROPERTY?: NO

## 2024-09-23 SDOH — HEALTH STABILITY: MENTAL HEALTH: NON-SPECIFIC ACTIVE SUICIDAL THOUGHTS (PAST 1 MONTH): NO

## 2024-09-23 SDOH — HEALTH STABILITY: MENTAL HEALTH: WISH TO BE DEAD (PAST 1 MONTH): NO

## 2024-09-23 SDOH — SOCIAL STABILITY: SOCIAL INSECURITY: ARE YOU OR HAVE YOU BEEN THREATENED OR ABUSED PHYSICALLY, EMOTIONALLY, OR SEXUALLY BY ANYONE?: NO

## 2024-09-23 SDOH — SOCIAL STABILITY: SOCIAL INSECURITY: VERBAL ABUSE: DENIES

## 2024-09-23 SDOH — HEALTH STABILITY: MENTAL HEALTH: CURRENT SMOKER: 0

## 2024-09-23 SDOH — HEALTH STABILITY: MENTAL HEALTH: HOW OFTEN DO YOU HAVE A DRINK CONTAINING ALCOHOL?: NEVER

## 2024-09-23 SDOH — SOCIAL STABILITY: SOCIAL INSECURITY: PHYSICAL ABUSE: DENIES

## 2024-09-23 SDOH — SOCIAL STABILITY: SOCIAL INSECURITY: HAVE YOU HAD ANY THOUGHTS OF HARMING ANYONE ELSE?: NO

## 2024-09-23 SDOH — ECONOMIC STABILITY: HOUSING INSECURITY: DO YOU FEEL UNSAFE GOING BACK TO THE PLACE WHERE YOU ARE LIVING?: NO

## 2024-09-23 SDOH — HEALTH STABILITY: MENTAL HEALTH: SUICIDAL BEHAVIOR (LIFETIME): NO

## 2024-09-23 SDOH — HEALTH STABILITY: MENTAL HEALTH: HOW OFTEN DO YOU HAVE 6 OR MORE DRINKS ON ONE OCCASION?: NEVER

## 2024-09-23 SDOH — SOCIAL STABILITY: SOCIAL INSECURITY: DOES ANYONE TRY TO KEEP YOU FROM HAVING/CONTACTING OTHER FRIENDS OR DOING THINGS OUTSIDE YOUR HOME?: NO

## 2024-09-23 SDOH — SOCIAL STABILITY: SOCIAL INSECURITY: HAVE YOU HAD THOUGHTS OF HARMING ANYONE ELSE?: NO

## 2024-09-23 SDOH — SOCIAL STABILITY: SOCIAL INSECURITY: ARE THERE ANY APPARENT SIGNS OF INJURIES/BEHAVIORS THAT COULD BE RELATED TO ABUSE/NEGLECT?: NO

## 2024-09-23 SDOH — SOCIAL STABILITY: SOCIAL INSECURITY: ABUSE SCREEN: ADULT

## 2024-09-23 SDOH — HEALTH STABILITY: MENTAL HEALTH: WERE YOU ABLE TO COMPLETE ALL THE BEHAVIORAL HEALTH SCREENINGS?: YES

## 2024-09-23 SDOH — HEALTH STABILITY: MENTAL HEALTH: HOW MANY STANDARD DRINKS CONTAINING ALCOHOL DO YOU HAVE ON A TYPICAL DAY?: PATIENT DOES NOT DRINK

## 2024-09-23 SDOH — SOCIAL STABILITY: SOCIAL INSECURITY: HAS ANYONE EVER THREATENED TO HURT YOUR FAMILY OR YOUR PETS?: NO

## 2024-09-23 ASSESSMENT — PAIN SCALES - GENERAL
PAINLEVEL_OUTOF10: 0 - NO PAIN

## 2024-09-23 ASSESSMENT — LIFESTYLE VARIABLES
HOW OFTEN DO YOU HAVE A DRINK CONTAINING ALCOHOL: NEVER
AUDIT-C TOTAL SCORE: 0
SKIP TO QUESTIONS 9-10: 1
AUDIT-C TOTAL SCORE: 0
HOW MANY STANDARD DRINKS CONTAINING ALCOHOL DO YOU HAVE ON A TYPICAL DAY: PATIENT DOES NOT DRINK
AUDIT-C TOTAL SCORE: 0
HOW OFTEN DO YOU HAVE 6 OR MORE DRINKS ON ONE OCCASION: NEVER
SKIP TO QUESTIONS 9-10: 1

## 2024-09-23 ASSESSMENT — ACTIVITIES OF DAILY LIVING (ADL): LACK_OF_TRANSPORTATION: NO

## 2024-09-23 ASSESSMENT — PATIENT HEALTH QUESTIONNAIRE - PHQ9
2. FEELING DOWN, DEPRESSED OR HOPELESS: NOT AT ALL
1. LITTLE INTEREST OR PLEASURE IN DOING THINGS: NOT AT ALL
SUM OF ALL RESPONSES TO PHQ9 QUESTIONS 1 & 2: 0

## 2024-09-24 ENCOUNTER — ANESTHESIA EVENT (OUTPATIENT)
Dept: OBSTETRICS AND GYNECOLOGY | Facility: HOSPITAL | Age: 24
End: 2024-09-24
Payer: COMMERCIAL

## 2024-09-24 ENCOUNTER — APPOINTMENT (OUTPATIENT)
Dept: OBSTETRICS AND GYNECOLOGY | Facility: CLINIC | Age: 24
End: 2024-09-24
Payer: COMMERCIAL

## 2024-09-24 ENCOUNTER — ANESTHESIA (OUTPATIENT)
Dept: OBSTETRICS AND GYNECOLOGY | Facility: HOSPITAL | Age: 24
End: 2024-09-24
Payer: COMMERCIAL

## 2024-09-24 LAB
ALBUMIN SERPL BCP-MCNC: 3.2 G/DL (ref 3.4–5)
ALP SERPL-CCNC: 135 U/L (ref 33–110)
ALT SERPL W P-5'-P-CCNC: 17 U/L (ref 7–45)
ANION GAP SERPL CALC-SCNC: 16 MMOL/L (ref 10–20)
AST SERPL W P-5'-P-CCNC: 15 U/L (ref 9–39)
BILIRUB SERPL-MCNC: 0.4 MG/DL (ref 0–1.2)
BUN SERPL-MCNC: 7 MG/DL (ref 6–23)
CALCIUM SERPL-MCNC: 8.5 MG/DL (ref 8.6–10.6)
CHLORIDE SERPL-SCNC: 101 MMOL/L (ref 98–107)
CO2 SERPL-SCNC: 21 MMOL/L (ref 21–32)
CREAT SERPL-MCNC: 0.52 MG/DL (ref 0.5–1.05)
EGFRCR SERPLBLD CKD-EPI 2021: >90 ML/MIN/1.73M*2
GLUCOSE SERPL-MCNC: 88 MG/DL (ref 74–99)
POTASSIUM SERPL-SCNC: 4.2 MMOL/L (ref 3.5–5.3)
PROT SERPL-MCNC: 6 G/DL (ref 6.4–8.2)
SODIUM SERPL-SCNC: 134 MMOL/L (ref 136–145)

## 2024-09-24 PROCEDURE — 2500000004 HC RX 250 GENERAL PHARMACY W/ HCPCS (ALT 636 FOR OP/ED): Performed by: ANESTHESIOLOGIST ASSISTANT

## 2024-09-24 PROCEDURE — 2500000001 HC RX 250 WO HCPCS SELF ADMINISTERED DRUGS (ALT 637 FOR MEDICARE OP)

## 2024-09-24 PROCEDURE — 3700000014 EPIDURAL BLOCK: Performed by: ANESTHESIOLOGY

## 2024-09-24 PROCEDURE — 2500000004 HC RX 250 GENERAL PHARMACY W/ HCPCS (ALT 636 FOR OP/ED): Performed by: NURSE ANESTHETIST, CERTIFIED REGISTERED

## 2024-09-24 PROCEDURE — 0KQM0ZZ REPAIR PERINEUM MUSCLE, OPEN APPROACH: ICD-10-PCS | Performed by: STUDENT IN AN ORGANIZED HEALTH CARE EDUCATION/TRAINING PROGRAM

## 2024-09-24 PROCEDURE — 2500000001 HC RX 250 WO HCPCS SELF ADMINISTERED DRUGS (ALT 637 FOR MEDICARE OP): Performed by: STUDENT IN AN ORGANIZED HEALTH CARE EDUCATION/TRAINING PROGRAM

## 2024-09-24 PROCEDURE — 7210000002 HC LABOR PER HOUR

## 2024-09-24 PROCEDURE — 1210000001 HC SEMI-PRIVATE ROOM DAILY

## 2024-09-24 PROCEDURE — 80053 COMPREHEN METABOLIC PANEL: CPT

## 2024-09-24 PROCEDURE — 2500000005 HC RX 250 GENERAL PHARMACY W/O HCPCS: Performed by: ANESTHESIOLOGIST ASSISTANT

## 2024-09-24 PROCEDURE — 2500000002 HC RX 250 W HCPCS SELF ADMINISTERED DRUGS (ALT 637 FOR MEDICARE OP, ALT 636 FOR OP/ED)

## 2024-09-24 PROCEDURE — 99199 UNLISTED SPECIAL SVC PX/RPRT: CPT

## 2024-09-24 PROCEDURE — 36415 COLL VENOUS BLD VENIPUNCTURE: CPT

## 2024-09-24 PROCEDURE — 4A1H7CZ MONITORING OF PRODUCTS OF CONCEPTION, CARDIAC RATE, VIA NATURAL OR ARTIFICIAL OPENING: ICD-10-PCS | Performed by: STUDENT IN AN ORGANIZED HEALTH CARE EDUCATION/TRAINING PROGRAM

## 2024-09-24 PROCEDURE — 59409 OBSTETRICAL CARE: CPT

## 2024-09-24 PROCEDURE — 59409 OBSTETRICAL CARE: CPT | Performed by: STUDENT IN AN ORGANIZED HEALTH CARE EDUCATION/TRAINING PROGRAM

## 2024-09-24 PROCEDURE — 51702 INSERT TEMP BLADDER CATH: CPT

## 2024-09-24 PROCEDURE — 10907ZC DRAINAGE OF AMNIOTIC FLUID, THERAPEUTIC FROM PRODUCTS OF CONCEPTION, VIA NATURAL OR ARTIFICIAL OPENING: ICD-10-PCS

## 2024-09-24 PROCEDURE — 2500000004 HC RX 250 GENERAL PHARMACY W/ HCPCS (ALT 636 FOR OP/ED): Performed by: STUDENT IN AN ORGANIZED HEALTH CARE EDUCATION/TRAINING PROGRAM

## 2024-09-24 PROCEDURE — 2500000005 HC RX 250 GENERAL PHARMACY W/O HCPCS: Performed by: STUDENT IN AN ORGANIZED HEALTH CARE EDUCATION/TRAINING PROGRAM

## 2024-09-24 PROCEDURE — 2500000005 HC RX 250 GENERAL PHARMACY W/O HCPCS

## 2024-09-24 RX ORDER — SERTRALINE HYDROCHLORIDE 50 MG/1
50 TABLET, FILM COATED ORAL NIGHTLY
Status: DISCONTINUED | OUTPATIENT
Start: 2024-09-24 | End: 2024-09-27 | Stop reason: HOSPADM

## 2024-09-24 RX ORDER — ENOXAPARIN SODIUM 100 MG/ML
80 INJECTION SUBCUTANEOUS EVERY 24 HOURS
Status: DISCONTINUED | OUTPATIENT
Start: 2024-09-25 | End: 2024-09-27 | Stop reason: HOSPADM

## 2024-09-24 RX ORDER — MISOPROSTOL 200 UG/1
800 TABLET ORAL ONCE AS NEEDED
Status: DISCONTINUED | OUTPATIENT
Start: 2024-09-24 | End: 2024-09-27 | Stop reason: HOSPADM

## 2024-09-24 RX ORDER — ONDANSETRON HYDROCHLORIDE 2 MG/ML
4 INJECTION, SOLUTION INTRAVENOUS EVERY 6 HOURS PRN
Status: DISCONTINUED | OUTPATIENT
Start: 2024-09-24 | End: 2024-09-27 | Stop reason: HOSPADM

## 2024-09-24 RX ORDER — FENTANYL/BUPIVACAINE/NS/PF 2MCG/ML-.1
PLASTIC BAG, INJECTION (ML) INJECTION AS NEEDED
Status: DISCONTINUED | OUTPATIENT
Start: 2024-09-24 | End: 2024-09-24

## 2024-09-24 RX ORDER — OXYTOCIN 10 [USP'U]/ML
10 INJECTION, SOLUTION INTRAMUSCULAR; INTRAVENOUS ONCE AS NEEDED
Status: DISCONTINUED | OUTPATIENT
Start: 2024-09-24 | End: 2024-09-27 | Stop reason: HOSPADM

## 2024-09-24 RX ORDER — CARBOPROST TROMETHAMINE 250 UG/ML
250 INJECTION, SOLUTION INTRAMUSCULAR ONCE AS NEEDED
Status: DISCONTINUED | OUTPATIENT
Start: 2024-09-24 | End: 2024-09-27 | Stop reason: HOSPADM

## 2024-09-24 RX ORDER — TRANEXAMIC ACID 100 MG/ML
1000 INJECTION, SOLUTION INTRAVENOUS ONCE AS NEEDED
Status: ACTIVE | OUTPATIENT
Start: 2024-09-24 | End: 2024-09-27

## 2024-09-24 RX ORDER — DIPHENHYDRAMINE HCL 25 MG
25 CAPSULE ORAL EVERY 6 HOURS PRN
Status: DISCONTINUED | OUTPATIENT
Start: 2024-09-24 | End: 2024-09-27 | Stop reason: HOSPADM

## 2024-09-24 RX ORDER — METHYLERGONOVINE MALEATE 0.2 MG/ML
0.2 INJECTION INTRAVENOUS ONCE AS NEEDED
Status: DISCONTINUED | OUTPATIENT
Start: 2024-09-24 | End: 2024-09-27 | Stop reason: HOSPADM

## 2024-09-24 RX ORDER — BISACODYL 10 MG/1
10 SUPPOSITORY RECTAL DAILY PRN
Status: DISCONTINUED | OUTPATIENT
Start: 2024-09-24 | End: 2024-09-27 | Stop reason: HOSPADM

## 2024-09-24 RX ORDER — OXYTOCIN/0.9 % SODIUM CHLORIDE 30/500 ML
2-30 PLASTIC BAG, INJECTION (ML) INTRAVENOUS CONTINUOUS
Status: DISCONTINUED | OUTPATIENT
Start: 2024-09-24 | End: 2024-09-24

## 2024-09-24 RX ORDER — BUPIVACAINE HYDROCHLORIDE 2.5 MG/ML
INJECTION, SOLUTION EPIDURAL; INFILTRATION; INTRACAUDAL AS NEEDED
Status: DISCONTINUED | OUTPATIENT
Start: 2024-09-24 | End: 2024-09-24

## 2024-09-24 RX ORDER — NIFEDIPINE 10 MG/1
10 CAPSULE ORAL ONCE AS NEEDED
Status: DISCONTINUED | OUTPATIENT
Start: 2024-09-24 | End: 2024-09-27 | Stop reason: HOSPADM

## 2024-09-24 RX ORDER — OXYTOCIN/0.9 % SODIUM CHLORIDE 30/500 ML
60 PLASTIC BAG, INJECTION (ML) INTRAVENOUS ONCE AS NEEDED
Status: DISCONTINUED | OUTPATIENT
Start: 2024-09-24 | End: 2024-09-27 | Stop reason: HOSPADM

## 2024-09-24 RX ORDER — POLYETHYLENE GLYCOL 3350 17 G/17G
17 POWDER, FOR SOLUTION ORAL 2 TIMES DAILY PRN
Status: DISCONTINUED | OUTPATIENT
Start: 2024-09-24 | End: 2024-09-27 | Stop reason: HOSPADM

## 2024-09-24 RX ORDER — FENTANYL/BUPIVACAINE/NS/PF 2MCG/ML-.1
PLASTIC BAG, INJECTION (ML) INJECTION CONTINUOUS PRN
Status: DISCONTINUED | OUTPATIENT
Start: 2024-09-24 | End: 2024-09-24

## 2024-09-24 RX ORDER — ACETAMINOPHEN 325 MG/1
975 TABLET ORAL EVERY 6 HOURS
Status: DISCONTINUED | OUTPATIENT
Start: 2024-09-24 | End: 2024-09-27 | Stop reason: HOSPADM

## 2024-09-24 RX ORDER — ONDANSETRON 4 MG/1
4 TABLET, FILM COATED ORAL EVERY 6 HOURS PRN
Status: DISCONTINUED | OUTPATIENT
Start: 2024-09-24 | End: 2024-09-27 | Stop reason: HOSPADM

## 2024-09-24 RX ORDER — HYDRALAZINE HYDROCHLORIDE 20 MG/ML
5 INJECTION INTRAMUSCULAR; INTRAVENOUS ONCE AS NEEDED
Status: DISCONTINUED | OUTPATIENT
Start: 2024-09-24 | End: 2024-09-27 | Stop reason: HOSPADM

## 2024-09-24 RX ORDER — IBUPROFEN 600 MG/1
600 TABLET ORAL EVERY 6 HOURS
Status: DISCONTINUED | OUTPATIENT
Start: 2024-09-24 | End: 2024-09-27 | Stop reason: HOSPADM

## 2024-09-24 RX ORDER — FAMOTIDINE 20 MG/1
10 TABLET, FILM COATED ORAL 2 TIMES DAILY
Status: DISCONTINUED | OUTPATIENT
Start: 2024-09-24 | End: 2024-09-27 | Stop reason: HOSPADM

## 2024-09-24 RX ORDER — SIMETHICONE 80 MG
80 TABLET,CHEWABLE ORAL 4 TIMES DAILY PRN
Status: DISCONTINUED | OUTPATIENT
Start: 2024-09-24 | End: 2024-09-27 | Stop reason: HOSPADM

## 2024-09-24 RX ORDER — MORPHINE SULFATE 4 MG/ML
4 INJECTION INTRAVENOUS ONCE
Status: DISCONTINUED | OUTPATIENT
Start: 2024-09-24 | End: 2024-09-24

## 2024-09-24 RX ORDER — LIDOCAINE 560 MG/1
1 PATCH PERCUTANEOUS; TOPICAL; TRANSDERMAL
Status: DISCONTINUED | OUTPATIENT
Start: 2024-09-24 | End: 2024-09-27 | Stop reason: HOSPADM

## 2024-09-24 RX ORDER — LABETALOL HYDROCHLORIDE 5 MG/ML
20 INJECTION, SOLUTION INTRAVENOUS ONCE AS NEEDED
Status: DISCONTINUED | OUTPATIENT
Start: 2024-09-24 | End: 2024-09-27 | Stop reason: HOSPADM

## 2024-09-24 RX ORDER — PHENYLEPHRINE HCL IN 0.9% NACL 0.4MG/10ML
SYRINGE (ML) INTRAVENOUS AS NEEDED
Status: DISCONTINUED | OUTPATIENT
Start: 2024-09-24 | End: 2024-09-24

## 2024-09-24 RX ORDER — LOPERAMIDE HYDROCHLORIDE 2 MG/1
4 CAPSULE ORAL EVERY 2 HOUR PRN
Status: DISCONTINUED | OUTPATIENT
Start: 2024-09-24 | End: 2024-09-27 | Stop reason: HOSPADM

## 2024-09-24 RX ORDER — DIPHENHYDRAMINE HYDROCHLORIDE 50 MG/ML
25 INJECTION INTRAMUSCULAR; INTRAVENOUS EVERY 6 HOURS PRN
Status: DISCONTINUED | OUTPATIENT
Start: 2024-09-24 | End: 2024-09-27 | Stop reason: HOSPADM

## 2024-09-24 RX ORDER — ADHESIVE BANDAGE
10 BANDAGE TOPICAL
Status: DISCONTINUED | OUTPATIENT
Start: 2024-09-24 | End: 2024-09-27 | Stop reason: HOSPADM

## 2024-09-24 SDOH — HEALTH STABILITY: MENTAL HEALTH: CURRENT SMOKER: 0

## 2024-09-24 ASSESSMENT — PAIN SCALES - GENERAL
PAINLEVEL_OUTOF10: 0 - NO PAIN
PAINLEVEL_OUTOF10: 2
PAINLEVEL_OUTOF10: 4
PAINLEVEL_OUTOF10: 3
PAINLEVEL_OUTOF10: 3
PAINLEVEL_OUTOF10: 6
PAINLEVEL_OUTOF10: 0 - NO PAIN
PAINLEVEL_OUTOF10: 0 - NO PAIN
PAINLEVEL_OUTOF10: 4
PAINLEVEL_OUTOF10: 0 - NO PAIN
PAINLEVEL_OUTOF10: 3
PAINLEVEL_OUTOF10: 4
PAINLEVEL_OUTOF10: 6
PAINLEVEL_OUTOF10: 5 - MODERATE PAIN
PAINLEVEL_OUTOF10: 4
PAINLEVEL_OUTOF10: 4
PAINLEVEL_OUTOF10: 5 - MODERATE PAIN
PAINLEVEL_OUTOF10: 4
PAINLEVEL_OUTOF10: 5 - MODERATE PAIN

## 2024-09-24 NOTE — H&P
OB Admission H&P    ASSESSMENT & PLAN: Katy Roldan is a 24 y.o.  at 39w0d based on IUI conception date, confirmed on 22w US admitted for IOL in the setting of class III obesity (BMI 50)    Plan:   IOL  -Admit to L&D, consented  -T&S, CBC, and Syphilis  -Pain management: hoping to go as long as possible without epidural, epidural at patient request  -Recheck as clinically indicated by maternal or fetal status  -Plan to initiate induction with cervical ripening with CRB and cyto  -pitocin and AROM when appropriate     Fetal Status  -Cat 1 fetal tracing   -Presentation cephalic based on Ultrasound, scanned with Dr. Farr   -EFW ~3800g extrapolated from ultrasound on    -GBS positive, on PCN     Pregnancy Notables:   -Class III obesity (BMI 50)  -hypothyroidism, on synthroid  -depression/anxiety, on sertraline 50mg     -Postpartum Contraception Plan:  declines, plans to use condoms    Discussed with Dr. 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 Agree with assessment above, edits made within text.    Mel Coy MD  PGY1, Obstetrics and Gynecology      Subjective   Patient reports good fetal movement. Denies vaginal bleeding. Denies leaking of fluid. Feeling occasional contractions. She is planning to breastfeed.     Prenatal Provider: Dr. Dawkins       OB History    Para Term  AB Living   3 0 0 0 2 0   SAB IAB Ectopic Multiple Live Births   2 0 0 0 0      # Outcome Date GA Lbr Lewis/2nd Weight Sex Type Anes PTL Lv   3 Current            2 SAB 23     Biochemical      1 SAB                Past Surgical History:   Procedure Laterality Date    OTHER SURGICAL HISTORY  2022    [place] tooth extraction       Social History     Tobacco Use    Smoking status: Never    Smokeless tobacco: Never   Substance Use Topics    Alcohol use: Not Currently       Allergies   Allergen Reactions    Montelukast Other     Rapid  heart beat    Cefuroxime Rash    Cetirizine Rash       Medications Prior to Admission   Medication Sig Dispense Refill Last Dose    aspirin 81 mg EC tablet Take 2 tablets (162 mg) by mouth once daily. Start taking at 12 weeks and continue until you have your baby 180 tablet 2 9/22/2024 at 2200    famotidine (Pepcid AC) 10 mg tablet Take 1 tablet (10 mg) by mouth once daily.   9/23/2024 at 1000    levothyroxine (Synthroid) 150 mcg tablet Take 1 tablet (150 mcg) by mouth once daily in the morning. Take before meals. 90 tablet 3 9/23/2024 at 0900    prenatal vitamin, iron-folic, (Prenatal Vitamin) 27 mg iron-800 mcg folic acid tablet Take 1 tablet by mouth once daily.   9/22/2024 at 2200    sertraline (Zoloft) 50 mg tablet Take 1 tablet (50 mg) by mouth once daily. as directed   9/22/2024 at 2200     Objective     Last Vitals  Temp Pulse Resp BP MAP O2 Sat   36.4 °C (97.5 °F) 79 17 131/84 103 99 %       Physical Exam  General: no acute distress  HEENT: normocephalic, atraumatic, anicteric   Heart: regular rate, warm and well perfused  Lungs: Breathing comfortably on room air  Abdomen: gravid  Extremities: moving all extremities  Neuro: awake and conversant  Psych: appropriate mood and affect    Cervix: 1/30/-2  FHT: 120/mod/+accel/-decel    Bedside Ultrasound: yes    Labs in chart were reviewed.     Results from last 7 days   Lab Units 09/23/24 2015 09/18/24  1257   WBC AUTO x10*3/uL 9.4 8.8   HEMOGLOBIN g/dL 11.8* 11.0*   HEMATOCRIT % 35.7* 34.5*   PLATELETS AUTO x10*3/uL 295 245   AST U/L  --  13   ALT U/L  --  14   CREATININE mg/dL  --  0.47*        Prenatal labs remarkable for:  - elevated TSH (5.06) 1/26/24 which returned to normal after synthroid was increased.

## 2024-09-24 NOTE — SIGNIFICANT EVENT
To bedside for FSE placement for inability to tace externally. Patient feeling sharp pain with contractions, anesthesia to return to bedside for evaluation. On SVE /-1, FSE placed.     Cervical Exam  Dilation: 6  Effacement (%): 90  Fetal Station: -1  Method: Manual  OB Examiner: Sophia PEREA  Fetal Assessment  Movement: Present  Mode: External US  Baseline Fetal Heart Rate (bpm): 130 bpm  Baseline Classification: Normal  Variability: Moderate (Between 6 and 25 BPM)  Pattern: Accelerations  Pattern Observations: pt up to bathroom  FHR Category: Category I      Contraction Frequency: 2-5     Active labor  - continue pitocin per protocol  - anesthesia to evaluate epidural   - anticipate     Aysha Cortes MD  OB/GYN PGY4

## 2024-09-24 NOTE — ANESTHESIA PREPROCEDURE EVALUATION
Patient: Katy Roldan    Evaluation Method: In-person visit    Procedure Information    Date: 24  Procedure: Labor Consult         Relevant Problems   Anesthesia (within normal limits)  No family hx. [place] teeth removal.      Cardiac (within normal limits)      Pulmonary (within normal limits)      Neuro   (+) Anxiety and depression      GI   (+) Gastroesophageal reflux disease without esophagitis      /Renal (within normal limits)      Liver   (+) NAFL (nonalcoholic fatty liver)      Endocrine   (+) Acquired hypothyroidism   (+) Obesity      Hematology (within normal limits)      Musculoskeletal (within normal limits)      GYN   (+) 38 weeks gestation of pregnancy ([facility])       Clinical information reviewed:    Allergies  Meds               NPO Detail:  No data recorded     OB/Gyn Evaluation    Present Pregnancy    Patient is pregnant now ( @ 39 weeks for term IOL).   Obstetric History                Physical Exam    Airway  Mallampati: III  TM distance: >3 FB  Neck ROM: full     Cardiovascular    Dental   Comments: Denies dental issues.   Pulmonary    Abdominal            Anesthesia Plan    History of general anesthesia?: yes  History of complications of general anesthesia?: no    ASA 3     epidural   (Discussed RBA of regional anesthesia, and GA for emergent procedures. Pt declined further questions)  The patient is not a current smoker.    Anesthetic plan and risks discussed with patient.  Use of blood products discussed with patient who consented to blood products.

## 2024-09-24 NOTE — SIGNIFICANT EVENT
BP Cuff and Home Monitoring  Patient meets criteria for home monitoring of blood pressure post discharge.  Reason:  currently has 2 mild range blood pressures > 4 hours apart. Met with patient to assess for availability of home BP monitor.   Patient does not have access to BP monitor at home.  Pt agreed to order home BP monitor from [employer] across global offices.   Large BP monitor delivered to room.. Patient educated on importance of continuing to monitor BP at home, recording BP on home monitoring log and s/sx of when to call her provider.  Pt verbalized understanding the above information.

## 2024-09-24 NOTE — LACTATION NOTE
Lactation Consultant Note  Lactation Consultation  Reason for Consult: Initial assessment  Consultant Name: Gisselle Headley RN, IBCLC    Maternal Information  Has mother  before?: No  Infant to breast within first 2 hours of birth?: Yes  Exclusive Pump and Bottle Feed: No    Maternal Assessment  Breast Assessment: Medium, Pendulous, Soft, Warm, Compressible (expressible bilaterally)  Nipple Assessment: Intact (flatten with compression)  Areola Assessment:  (some areolar edema surrounding bilateral nipples left more so than right)    Infant Assessment  Infant Behavior: Rooting response, Light sleep  Infant Assessment: Good cupping of tongue    Feeding Assessment  Nutrition Source: Breastmilk  Feeding Method: Nursing at the breast  Feeding Position: Skin to skin, Laid back, Football/seated, Both sides, Mother needs assistance with latch/positioning  Suck/Feeding: Unsustained, Sustained, Tactile stimulation needed, Nipple shield used (latch more sustained with nipple shield use)  Latch Assessment: Maximum assistance is needed, Shallow latch, Mouth not open wide enough, Flanged lips, Comfortable latch    LATCH TOOL  Latch: Repeated attempts, hold nipple in mouth, stimulate to suck  Audible Swallowing: None  Type of Nipple: Everted (After stimulation)  Comfort (Breast/Nipple): Soft/non-tender  Hold (Positioning): Full assist, staff holds infant at breast  LATCH Score: 5    Breast Pump  Pump: Hospital grade electric pump  Frequency: 8-10 times per day (educated/encouraged to pump after feeds she uses nipple shield with)  Breast Shield Size and Type: 21 mm    Other OB Lactation Tools  Lactation Tools: Nipple shields    Patient Follow-up  Inpatient Lactation Follow-up Needed : Yes  Outpatient Lactation Follow-up: Recommended    Other OB Lactation Documentation  Maternal Risk Factors: Polycystic ovary syndrome, Hypothyroidism    Recommendations/Summary  Upon entering the room, mother states infant has not fed  since about 1300. Mother has been able to feed infant at right breast but having some difficulty with latch on left breast. Upon assessment, mother has some areolar edema more so on left than right breast. Reverse pressure softening performed, with compression nipples flatten.     First tried to latch infant in football hold at left breast with pillow support. Infant able to latch somewhat to breast but unable to sustain latch for longer than just a few sucks. Moved infant to laid back position and infant able to sustain latch for longer and more deeply on left breast in this position. No swallows noted. Infant then moved to right breast football hold with pillow support. Again, infant able to latch shallow and then would have a few sucks then unlatch. Suggested use of nipple shield to see if this would help with infant's ability to sustain latch. Mother agreeable.    Small nipple shield placed on right breast/nipple while infant was in football hold at right breast with pillow support. Infant able to latch more deeply to right breast. Infant began to have sucks with long jaw movements, I helped to manually flange infant's upper and lower lips out a bit. No swallows noted. Infant sustained latch for about 5-10 minutes before unlatching.     I educated mother on feeding infant based on feeding cues, waking infant if it has been 3 hours since last feed, feeding infant on first breast until he unlatches or until tactile stimulation is not keeping him sucking/swallowing at breast. I then recommended burping infant and then trying to latch/feed infant on other breast if still showing feeding cues. We also discussed normal  feeding pattern and milk supply pattern in the early postpartum period. Encouraged mother to pump if possible after using nipple shield. Educated mother on use of pump including initiate program, goal of pumping 8-10 times in a 24 hour period, how to assess for proper flange size, proper  cleaning of pump parts as well as milk storage guidelines.      Mother has a breast pump for home use. Outpatient lactation resources discussed with mother. I encouraged mother to call for any questions, concerns or assistance.

## 2024-09-24 NOTE — ANESTHESIA PROCEDURE NOTES
Epidural Block    Patient location during procedure: OB  Start time: 9/24/2024 2:38 AM  End time: 9/24/2024 2:49 AM  Reason for block: labor analgesia  Staffing  Performed: TARSHA   Authorized by: Reid Case MD PhD    Performed by: TARSHA Cat    Preanesthetic Checklist  Completed: patient identified, IV checked, risks and benefits discussed, surgical consent, monitors and equipment checked, pre-op evaluation, timeout performed and sterile techniques followed  Block Timeout  RN/Licensed healthcare professional reads aloud to the Anesthesia provider and entire team: Patient identity, procedure with side and site, patient position, and as applicable the availability of implants/special equipment/special requirements.  Patient on coagulant treatment: no  Timeout performed at: 9/24/2024 2:38 AM  Block Placement  Patient position: sitting  Prep: ChloraPrep  Sterility prep: cap, drape, gloves and mask  Sedation level: no sedation  Patient monitoring: blood pressure, continuous pulse oximetry and heart rate  Approach: midline  Local numbing: lidocaine 1% to skin and subcutaneous tissues  Vertebral space: lumbar  Lumbar location: L3-L4  Epidural  Loss of resistance technique: saline  Guidance: landmark technique        Needle  Needle type: Tuohy   Needle gauge: 17  Needle insertion depth: 7 cm  Catheter type: multi-orifice  Catheter size: 19 G  Catheter at skin depth: 12 cm  Catheter securement method: clear occlusive dressing and liquid medical adhesive    Test dose: lidocaine 1.5% with epinephrine 1-to-200,000  Test dose: lidocaine 1.5% with epinephrine 1-to-200,000  Test dose result: no positive test dose    PCEA  Medication concentration used: 0.044% Bupivacaine with 1.25 mcg/mL Fentanyl and 1:197839 Epinephrine  Dose (mL): 10  Lockout (minutes): 15  1-Hour Limit (boluses/hr): 4  Basal Rate: 14        Assessment  Number of attempts: 1  Events: no positive test dose  Procedure assessment: patient tolerated  procedure well with no immediate complications

## 2024-09-24 NOTE — SIGNIFICANT EVENT
Called to room for exam. Patient feeling more pressure. Feeling her contractions. Pain when lying on back at epidural site.     SVE: 4/70/-1  FHT: 130/mod/+accel/-decel  [place]: q2-3min     Latent labor   Not yet on pitocin, good contraction pattern currently, to start pitocin if contractions begin to space   To call anaesthesia for epidural assistance   Cat 1 fetal tracing     Discussed with Dr. Chika Coy MD  PGY1, Obstetrics and Gynecology

## 2024-09-24 NOTE — ANESTHESIA PREPROCEDURE EVALUATION
Patient: Katy Roldan    Evaluation Method: In-person visit    Procedure Information    Date: 24  Procedure: Labor Consult         Relevant Problems   Anesthesia (within normal limits)  No family hx. [place] teeth removal.      Cardiac (within normal limits)      Pulmonary (within normal limits)      Neuro   (+) Anxiety and depression      GI   (+) Gastroesophageal reflux disease without esophagitis      /Renal (within normal limits)      Liver   (+) NAFL (nonalcoholic fatty liver)      Endocrine   (+) Acquired hypothyroidism   (+) Obesity      Hematology (within normal limits)      Musculoskeletal (within normal limits)      GYN   (+) 38 weeks gestation of pregnancy ([facility])       Clinical information reviewed:    Allergies  Meds               NPO Detail:  No data recorded     OB/Gyn Evaluation    Present Pregnancy    Patient is pregnant now ( @ 39 weeks for term IOL).   Obstetric History                Physical Exam    Airway  Mallampati: III  TM distance: >3 FB  Neck ROM: full     Cardiovascular    Dental   Comments: Denies dental issues.   Pulmonary    Abdominal            Anesthesia Plan    History of general anesthesia?: yes  History of complications of general anesthesia?: no    ASA 3     epidural   (Discussed RBA of regional anesthesia, and GA for emergent procedures. Pt declined further questions)  The patient is not a current smoker.    Anesthetic plan and risks discussed with patient.  Use of blood products discussed with patient who consented to blood products.

## 2024-09-24 NOTE — ANESTHESIA PROCEDURE NOTES
Epidural Block    Patient location during procedure: OB  Start time: 9/24/2024 10:06 AM  Reason for block: labor analgesia  Staffing  Performed: CRNA   Authorized by: Job Willis MD    Performed by: SAÚL Santacruz-CRNA    Preanesthetic Checklist  Completed: patient identified, IV checked, risks and benefits discussed, surgical consent, pre-op evaluation, timeout performed and sterile techniques followed  Block Timeout  RN/Licensed healthcare professional reads aloud to the Anesthesia provider and entire team: Patient identity, procedure with side and site, patient position, and as applicable the availability of implants/special equipment/special requirements.  Patient on coagulant treatment: no  Timeout performed at: 9/24/2024 10:11 AM  Block Placement  Patient position: sitting  Prep: ChloraPrep  Sterility prep: cap, drape, gloves, hand and mask  Sedation level: no sedation  Patient monitoring: heart rate, continuous pulse oximetry and blood pressure  Approach: midline  Local numbing: lidocaine 1% to skin and subcutaneous tissues  Vertebral space: lumbar  Epidural  Loss of resistance technique: saline  Guidance: landmark technique        Needle  Needle type: Tuohy   Needle gauge: 17  Needle length: 10.2 cm  Catheter type: multi-orifice  Catheter size: 19 G  Catheter securement method: liquid medical adhesive and clear occlusive dressing      PCEA  Medication concentration used: 0.044% Bupivacaine with 1.25 mcg/mL Fentanyl and 1:295344 Epinephrine  Dose (mL): 10  Lockout (minutes): 15  1-Hour Limit (boluses/hr): 4  Basal Rate: 14

## 2024-09-24 NOTE — L&D DELIVERY NOTE
OB Delivery Note  2024  Katy Roldan  24 y.o.   Vaginal, Spontaneous     . EBL 200cc. 2nd degree perineal laceration repaired with 2-0 Vicryl. Good fundal tone noted.    Gestational Age: 39w1d  /Para:   Quantitative Blood Loss: Admission to Discharge: 222 mL (2024  7:30 AM - 2024  3:27 PM)    Celsa Roldan [[id number]]      Labor Events    Sac identifier: Sac 1  Rupture date/time: 2024 0050  Rupture type: Artificial  Fluid color: Bloody  Fluid odor: None  Labor type: Induced Onset of Labor  Labor allowed to proceed with plans for an attempted vaginal birth?: Yes  Induction: Misoprostol, Aguiar/EASI  First cervical ripening date/time: 2024  Induction date/time: 2024  Induction indications: Risk Reducing  Complications: None       Labor Event Times    Labor onset date/time: 2024  Dilation complete date/time: 2024  Start pushing date/time: 2024       Labor Length    1st stage: 12h 21m  2nd stage: 0h 14m  3rd stage: 0h 06m       Placenta    Placenta delivery date/time: 2024 1103  Placenta removal: Spontaneous  Placenta appearance: Intact  Placenta disposition: discarded       Cord    Vessels: 3 vessels  Complications: Nuchal  Nuchal intervention: reduced  Number of loops: 1  Delayed cord clamping?: Yes  Cord blood disposition: Lab  Gases sent?: No       Lacerations    Episiotomy: None  Perineal laceration: 2nd  Perineal laceration repaired?: Yes  Other lacerations?: No  Repair suture: 2-0 Synthetic Suture       Anesthesia    Method: Epidural       Operative Delivery    Forceps attempted?: No  Vacuum extractor attempted?: No       Shoulder Dystocia    Shoulder dystocia present?: No        Delivery    Time head delivered: 2024 10:57:00  Birth date/time: 2024 10:57:00  Delivery type: Vaginal, Spontaneous  Complications: None       Resuscitation    Method: Suctioning, Tactile stimulation       Apgars     Living status: Living  Apgar Component Scores:  1 min.:  5 min.:  10 min.:  15 min.:  20 min.:    Skin color:  1  1       Heart rate:  2  2       Reflex irritability:  1  2       Muscle tone:  2  2       Respiratory effort:  2  2       Total:  8  9       Apgars assigned by: KING MACKENZIE       Delivery Providers    Delivering clinician: Remigio Priest MD   Provider Role    Aysha Shabazz, RN Delivery Nurse    Chata Varghese, RN Nursery Nurse    Yesica Lucia MD Resident    Candelaria Chaves RN Delivery Nurse                   Yesica Lucia MD

## 2024-09-24 NOTE — SIGNIFICANT EVENT
To bedside to discuss AROM. Patient reports she is feeling better now that the CRB is out. Never got the morphine because the balloon came out.    SVE: 3/50/-2  FHT: 150/mod/+accel/-decel  [place]: q3-4min     Latent labor   AROM, for clear fluid, very minimal fluid, asked nurse to watch for leaking on bad to better assess AROM.   For pit at 2:30AM  Cat 1 fetal tracing     Mel Coy MD  PGY1, Obstetrics and Gynecology

## 2024-09-25 PROCEDURE — 2500000001 HC RX 250 WO HCPCS SELF ADMINISTERED DRUGS (ALT 637 FOR MEDICARE OP)

## 2024-09-25 PROCEDURE — 1210000001 HC SEMI-PRIVATE ROOM DAILY

## 2024-09-25 PROCEDURE — 2500000005 HC RX 250 GENERAL PHARMACY W/O HCPCS

## 2024-09-25 PROCEDURE — 2500000002 HC RX 250 W HCPCS SELF ADMINISTERED DRUGS (ALT 637 FOR MEDICARE OP, ALT 636 FOR OP/ED)

## 2024-09-25 PROCEDURE — 2500000004 HC RX 250 GENERAL PHARMACY W/ HCPCS (ALT 636 FOR OP/ED)

## 2024-09-25 ASSESSMENT — PAIN SCALES - GENERAL
PAINLEVEL_OUTOF10: 4
PAINLEVEL_OUTOF10: 0 - NO PAIN
PAINLEVEL_OUTOF10: 3
PAIN_LEVEL: 2
PAINLEVEL_OUTOF10: 0 - NO PAIN
PAINLEVEL_OUTOF10: 3

## 2024-09-25 ASSESSMENT — PAIN SCALES - PAIN ASSESSMENT IN ADVANCED DEMENTIA (PAINAD): TOTALSCORE: MEDICATION (SEE MAR)

## 2024-09-25 ASSESSMENT — PAIN DESCRIPTION - LOCATION
LOCATION: ABDOMEN
LOCATION: BACK
LOCATION: ABDOMEN

## 2024-09-25 ASSESSMENT — PAIN DESCRIPTION - DESCRIPTORS: DESCRIPTORS: SORE

## 2024-09-25 NOTE — LACTATION NOTE
Lactation Consultant Note  Lactation Consultation       Maternal Information       Maternal Assessment       Infant Assessment       Feeding Assessment       LATCH TOOL       Breast Pump       Other OB Lactation Tools       Patient Follow-up       Other OB Lactation Documentation       Recommendations/Summary  I did not view a latch at this visit.   Mom stated she last fed the baby at the breast at 1230 on the right breast with the nipple shield and then fed pumped breast milk via syringe.     She stated the latching is getting easier and baby is acting more interested in feeding. She verbalized she does need to use the nipple shield for the baby to maintain a latch but, knows she needs to pump after and is giving the pumped breast milk to the baby.   She is encouraged by the progression.     Reviewed feeding cues, breat feeding on demand, output on different days of life, milk production/ supply, and the other breastfeeding education topics.      Encouraged mom to breastfeed on demand with a goal of 8-12 feeds in a 24 hour period.   If baby is not showing feeding cues and it has been 3 hours since the last time the baby was fed or the last time the baby attempted to feed, encouraged her to place baby in skin to skin.    Encouraged her to use nipple shield if needed.   If she needs to use the shield; advised her to pump for 20 minutes on both breasts following latching to the breast and to feed the baby the pumped breast milk.     Mom has a breast pump for at home.   Denies any questions at this time.     Encouraged her to utilize the outpatient lactation resources, if needed.   Contact information given.   [phone] [facility] or [phone] [place]

## 2024-09-25 NOTE — ANESTHESIA POSTPROCEDURE EVALUATION
Patient: Katy Roldan    Procedure Summary       Date: 24 Room / Location:     Anesthesia Start: 0238 Anesthesia Stop:     Procedure: Labor Analgesia Diagnosis:     Scheduled Providers:  Responsible Provider: Job Willis MD    Anesthesia Type: epidural ASA Status: 3            Anesthesia Type: epidural    Vitals:    24   BP: 103/69   Pulse: 74   Resp: 20   Temp: 36.5 °C (97.7 °F)   SpO2: 96%           Anesthesia Post Evaluation    Patient location during evaluation: bedside  Patient participation: complete - patient participated  Level of consciousness: awake and alert  Pain score: 2  Pain management: adequate  Airway patency: patent  Cardiovascular status: acceptable  Respiratory status: acceptable  Hydration status: acceptable  Postoperative Nausea and Vomiting: none  Comments: Katy Roldan is a 24 y.o., , who had a Vaginal, Spontaneous delivery on 2024 at 39w1d and is now POD1.    She had Neuraxial Anesthesia without immediate complications noted.       Pain well controlled    ---------------------------               24         ---------------------------   BP:          103/69         Pulse:         74           Resp:          20           Temp:   36.5 °C (97.7 °F)   SpO2:          96%         ---------------------------    Neuraxial site assessed. No visible redness or swelling or drainage. Patient able to ambulate and move all extremities without difficulty. Able to void. No complaints of nausea/vomiting. Tolerating PO intake well. No s/sx of PDPH. Patient states she has back pain 2-3/10, but is comfortable with oral pain medication.    Anesthesia will sign off     TARSHA Del Valle         There were no known notable events for this encounter.

## 2024-09-25 NOTE — LACTATION NOTE
Lactation Consultant Note  Lactation Consultation       Maternal Information       Maternal Assessment       Infant Assessment       Feeding Assessment       LATCH TOOL       Breast Pump       Other OB Lactation Tools       Patient Follow-up       Other OB Lactation Documentation       Recommendations/Summary  Attempted to see patient but, Audiology was in the room at this time.   Will check back.

## 2024-09-25 NOTE — PROGRESS NOTES
Postpartum Progress Note    Assessment/Plan   Katy Roldan is a 24 y.o., , who delivered at 39w1d gestation    Now PPD#1 s/p Vaginal, Spontaneous on 2024  - Continue routine postpartum care  - Pain well controlled on po medications  - DVT risk score DVT Score: 5 , ppx with SCDs, ambulation, and lovenox  -  cc  - Hgb:   Results from last 7 days   Lab Units 24   HEMOGLOBIN g/dL 11.8*      - RH: Rhogam not indicated Type/[place]  Results from last 7 days   Lab Units 24   ABO GROUPING  O   RH TYPE  POS     gHTN  - dx by mild range Bps > 4 hrs apart  - additionally one severe while in labor (arm was bent) and one while in recovery  - asymptomatic  - normotensive postpartum day 1  - given BP for home  - discussed 3 day stay for BP monitoring, pt agreeable to plan of care    Depression/Anxiety  - mood stable  - continue Zoloft 50 daily     Maternal Well-Being  - Vitals stable  - All questions and concerns address    [place] Feeding  - Breastfeeding/pumping encouraged  - Lactation consult prn    Contraception  - Defers contraception to primary OB/PP visit. We discussed pregnancy spacing of at least one year, abstaining from intercourse for 6wks, and the ability to become pregnant in the absence of regular menses. Pt verbalized understanding.  - Encouraged to continue PNV    Dispo  - Anticipate DC PPD3 pending BP control.  - The signs and symptoms of PEC were reviewed with the patient, including unrelenting headache, vision changes/blurred vision, and pain underneath the right breast.   - BP cuff for home for checking BP BID. Pt instructed to call primary OB if SBP > 160 or DBP > 110.  - On discharge, follow up with primary OB in 2-5 days for BP check and 4-6 weeks for postpartum visit.    SAÚL Lepe-CNP     Principal Problem:    Encounter for induction of labor ([facility])  Active Problems:    Obesity    Pregnancy Problems (from 24 to present)       Problem Noted  Resolved    Encounter for induction of labor ([facility]) 9/23/2024 by Taylor Farr MD No    Priority:  Medium      Unstable lie of fetus ([facility]) 9/10/2024 by Linda Dawkins MD No    Priority:  Medium      Overview Addendum 9/16/2024  5:00 PM by Linda Dawkins MD     -Transverse back up at 36 weeks, cephalic at 37 weeks  -Cephalic at 38 weeks         38 weeks gestation of pregnancy ([facility]) 2/19/2024 by Linda Dawkins MD No    Priority:  Medium      Overview Addendum 9/17/2024  2:28 PM by Linda Dawkins MD     -Last pap (07/2022): NIL  -Declines cfDNA and NT, already had carrier testing with [facility]  -s/p tdap  -[facility] [place] for peds/POPs vs condoms + provera withdrawal for contraception  -Last growth US (9/5): EFW 3219 g (79%), AC 98%, HC 66%, ASIHSH 12.8, anterior placenta  -s/p flu shot 8/29/24  [ ] Growth at 30 and 36wga, NSTs at 34wga, delivery in 39th week  IOL 9/23 at 2000 at 39w0d at [facility]         Transverse lie of fetus ([facility]) 9/5/2024 by Linda Dawkins MD 9/10/2024 by Linda Dawkins MD    Overview Signed 9/5/2024  9:59 AM by Linda Dawkins MD     TRANSVERSE BACK UP AT 36w3d               Hospital course: gestational hypertension, vaginal delivery    Subjective      Katy Roldan is PPD#1 s/p vaginal delivery who reports feeling overall well.  No acute events overnight.  Voiding spontaneously, passing flatus.  Pain well controlled on PO meds.  Light lochia. Tolerating diet.  Denies HA, N/V, RUQ pain, vision changes, chest pain, or SOB. and Denies dizziness/lightheadedness/LOC/uncontrolled bleeding.    Objective   Allergies:   Montelukast, Cefuroxime, and Cetirizine         Last Vitals:  Temp Pulse Resp BP MAP Pulse Ox   36.8 °C (98.2 °F) 90 16 118/75   97 %     Vitals Min/Max Last 24 Hours:  Temp  Min: 36.1 °C (97 °F)  Max: 37 °C (98.6 °F)  Pulse  Min: 59  Max: 90  Resp  Min: 16  Max: 20  BP  Min: 103/69  Max: 135/83    Intake/Output:   No intake or output data in the 24 hours ending  09/25/24 1446    Physical Exam:  General: examination reveals a well developed, well nourished, female, in no acute distress. She is alert and cooperative.  HEENT: external ears normal. Nose normal, no erythema or discharge.  Neck: supple, no significant adenopathy  Lungs: breathing even and unlabored, lungs clear all fields  Cardiac: warm and well perfused, heart rate regular rate and rhythm, no murmur  Fundus: firm and below umbilicus, lochia light  Abdominal: soft, non tender, non-distended, bowel sounds active, + flatus  Extremities: no redness or tenderness in the calves or thighs, +1 pitting edema BLE.  Neurological: alert, oriented, normal speech, no focal findings or movement disorder noted.  Psychological: awake and alert; oriented to person, place, and time.  Skin: perineum well approximated, negative erythema/discharge/edema; small hemorrhoid < 1 cm,  no active bleeding noted    Lab Data:  0   Lab Value Date/Time    GRPBSTREP (A) 09/03/2024 1617     Isolated: Streptococcus agalactiae (Group B Streptococcus)     Lab Results   Component Value Date    GLUCOSE 88 09/24/2024     (L) 09/24/2024    K 4.2 09/24/2024     09/24/2024    CO2 21 09/24/2024    ANIONGAP 16 09/24/2024    BUN 7 09/24/2024    CREATININE 0.52 09/24/2024    EGFR >90 09/24/2024    CALCIUM 8.5 (L) 09/24/2024    ALBUMIN 3.2 (L) 09/24/2024    PROT 6.0 (L) 09/24/2024    ALKPHOS 135 (H) 09/24/2024    ALT 17 09/24/2024    AST 15 09/24/2024    BILITOT 0.4 09/24/2024     0   Lab Value Date/Time    UTPCR 0.15 09/18/2024 1406

## 2024-09-26 ENCOUNTER — APPOINTMENT (OUTPATIENT)
Dept: OBSTETRICS AND GYNECOLOGY | Facility: CLINIC | Age: 24
End: 2024-09-26
Payer: COMMERCIAL

## 2024-09-26 PROCEDURE — 2500000002 HC RX 250 W HCPCS SELF ADMINISTERED DRUGS (ALT 637 FOR MEDICARE OP, ALT 636 FOR OP/ED)

## 2024-09-26 PROCEDURE — 2500000001 HC RX 250 WO HCPCS SELF ADMINISTERED DRUGS (ALT 637 FOR MEDICARE OP)

## 2024-09-26 PROCEDURE — 1210000001 HC SEMI-PRIVATE ROOM DAILY

## 2024-09-26 PROCEDURE — 2500000004 HC RX 250 GENERAL PHARMACY W/ HCPCS (ALT 636 FOR OP/ED)

## 2024-09-26 RX ORDER — ACETAMINOPHEN 325 MG/1
975 TABLET ORAL EVERY 6 HOURS
Qty: 360 TABLET | Refills: 0 | Status: SHIPPED | OUTPATIENT
Start: 2024-09-26 | End: 2024-10-26

## 2024-09-26 RX ORDER — POLYETHYLENE GLYCOL 3350 17 G/17G
17 POWDER, FOR SOLUTION ORAL DAILY
Qty: 30 PACKET | Refills: 0 | Status: SHIPPED | OUTPATIENT
Start: 2024-09-26 | End: 2024-10-26

## 2024-09-26 RX ORDER — FERROUS SULFATE 325(65) MG
325 TABLET ORAL
Qty: 30 TABLET | Refills: 1 | Status: SHIPPED | OUTPATIENT
Start: 2024-09-26 | End: 2024-11-25

## 2024-09-26 RX ORDER — IBUPROFEN 600 MG/1
600 TABLET ORAL EVERY 6 HOURS
Qty: 120 TABLET | Refills: 0 | Status: SHIPPED | OUTPATIENT
Start: 2024-09-26 | End: 2024-10-26

## 2024-09-26 ASSESSMENT — PAIN SCALES - GENERAL
PAINLEVEL_OUTOF10: 0 - NO PAIN
PAINLEVEL_OUTOF10: 0 - NO PAIN

## 2024-09-26 NOTE — CARE PLAN
The patient's goals for the shift include discharge    The clinical goals for the shift include maintain normal vital signs    Patient's vss, assesssment stable, pain well controlled, lochia scant. Patient chose to stay one more night for more breast feeding support.       Problem: Postpartum  Goal: Experiences normal postpartum course  Outcome: Progressing     Problem: Postpartum  Goal: Appropriate maternal -  bonding  Outcome: Progressing     Problem: Postpartum  Goal: Establish and maintain infant feeding pattern for adequate nutrition  Outcome: Progressing    Problem: Pain - Adult  Goal: Verbalizes/displays adequate comfort level or baseline comfort level  Outcome: Progressing     Problem: Safety - Adult  Goal: Free from fall injury  Outcome: Progressing        Problem: Postpartum  Goal: No s/sx of hemorrhage  Outcome: Progressing

## 2024-09-26 NOTE — PROGRESS NOTES
Postpartum Progress Note      Assessment/Plan       Katy Roldan is a 24 y.o., , who initially presented for rrIOL. She had a Vaginal, Spontaneous delivery on 2024 at 39w1d and is now PPD2.      , c/b 2nd degree laceration repair  - continue routine care  - pain well controlled on ERAS protocol  - HMG 11.8 -> , 2nd degree repaired  - DVT Score: 5 SCDs and enoxaparin prophylactic dose daily while inpatient  - Pt's blood type is O POS. The baby's blood type is O POS. Rhogam is not indicated.    gHTN  - dx by mild range BP readings >4 hrs apart  - asx  - 1 severe range BP during labor  - normotensive with 1 isolated low mild range reading in PP period   - HELLP labs neg  - discussed 3 day stay for BP, pt verbalizes understanding   - BP cuff for home at discharge, to monitor BID    Dep/anx  - Sertraline 50 daily  - denies emotional concerns or need for medication adjustments at this time     Hypothyroidism  - Synthroid 150 daily  - follow up with OB/PCP for postpartum TSH check and dose adjustment if needed    Contraception  declines bridge method and discussed pregnancy spacing of at least one year, abstaining from intercourse for 6wks, and the ability to become pregnant in the absence of regular menses. Pt verbalized understanding    Maternal Well-Being  - emotional support provided  - bonding with infant  -  feeding: breastfeeding/pumping encouraged; lactation consult prn     Dispo  - anticipate d/c on PPD#3 if meeting all post-op and postpartum milestones    - The signs and symptoms of PEC were reviewed with the patient, including unrelenting headache, vision changes/blurred vision, and RUQ pain  - BP cuff for home for checking BP twice a day  - Pt instructed to call primary OB if SBP > 160 or DBP > 110 or if development of PEC symptoms   - On discharge, follow up with primary OB in:       - 2-5 days for BP check       - 4-6 weeks for post-partum visit       Principal Problem:     Encounter for induction of labor ([facility])  Active Problems:    Obesity        Subjective   Her pain is well controlled with current medications  She is passing flatus  She is ambulating independently  She is tolerating a Adult diet Regular  She is voiding spontaneously  She reports no breast or nursing problems  She denies emotional concerns today     Denies CP, SOB, RUQ pain, scotoma, vision changes, HA.      Objective   Last Vitals:  Temp Pulse Resp BP MAP Pulse Ox   36.2 °C (97.2 °F) 79 18 119/81   97 %       Physical Exam:  General: well appearing, well nourished, postpartum  Obstetric: fundus firm below umbilicus, lochia light  Skin: Warm, dry; no rashes/lesions/erythema  Neuro: A/Ox3, no gross motor deficit   GI: no distension, appropriately tender, soft  Respiratory: Even and unlabored on RA  Cardiovascular: Trace BLE edema; No erythema, warmth  Psych: appropriate mood and affect      Lab Data:  Lab Results   Component Value Date    WBC 9.4 09/23/2024    HGB 11.8 (L) 09/23/2024    HCT 35.7 (L) 09/23/2024     09/23/2024

## 2024-09-27 VITALS
OXYGEN SATURATION: 96 % | HEIGHT: 59 IN | RESPIRATION RATE: 20 BRPM | WEIGHT: 251.54 LBS | SYSTOLIC BLOOD PRESSURE: 118 MMHG | DIASTOLIC BLOOD PRESSURE: 80 MMHG | TEMPERATURE: 98.1 F | HEART RATE: 78 BPM | BODY MASS INDEX: 50.71 KG/M2

## 2024-09-27 PROCEDURE — 2500000001 HC RX 250 WO HCPCS SELF ADMINISTERED DRUGS (ALT 637 FOR MEDICARE OP)

## 2024-09-27 PROCEDURE — 99238 HOSP IP/OBS DSCHRG MGMT 30/<: CPT

## 2024-09-27 RX ORDER — SERTRALINE HYDROCHLORIDE 50 MG/1
50 TABLET, FILM COATED ORAL NIGHTLY
Qty: 30 TABLET | Refills: 0 | Status: SHIPPED | OUTPATIENT
Start: 2024-09-27 | End: 2024-10-27

## 2024-09-27 RX ORDER — LEVOTHYROXINE SODIUM 150 UG/1
150 TABLET ORAL
Qty: 30 TABLET | Refills: 0 | Status: SHIPPED | OUTPATIENT
Start: 2024-09-27 | End: 2024-10-27

## 2024-09-27 ASSESSMENT — PAIN DESCRIPTION - DESCRIPTORS
DESCRIPTORS: DISCOMFORT;SORE
DESCRIPTORS: DISCOMFORT;SORE

## 2024-09-27 ASSESSMENT — PAIN SCALES - GENERAL
PAINLEVEL_OUTOF10: 1
PAINLEVEL_OUTOF10: 2

## 2024-09-27 NOTE — DISCHARGE SUMMARY
Discharge Summary    Admission Date: 2024  Discharge Date: 24     Discharge Diagnosis  Encounter for induction of labor    Hospital Course  Delivery Date: 2024 10:57 AM  Delivery type: Vaginal, Spontaneous   GA at delivery: 39w1d  Outcome: Living  Anesthesia during delivery: Epidural  Intrapartum complications: None  Feeding method: Breastfeeding Status: Yes     Procedures: none  Contraception at discharge: Defers contraception to primary OB/PP visit, interested in IUD vs POPs. We discussed pregnancy spacing of at least one year, abstaining from intercourse for 6wks, and the ability to become pregnant in the absence of regular menses. Pt verbalized understanding.     Katy Roldan is a 24 y.o., , who initially presented for rrIOL. She had a Vaginal, Spontaneous delivery on 2024 at 39w1d c/b 2nd degree tear s/p repair and is now PPD3.     gHTN  - dx by mild range BP readings >4 hrs apart  - asx  - 1 severe range BP during labor  - normotensive with 1 isolated low mild range reading in PP period   - HELLP labs neg  - BP cuff for home at discharge, to monitor BID     Dep/anx  - Sertraline 50 daily  - denies emotional concerns or need for medication adjustments at this time      Hypothyroidism  - Synthroid 150 daily  - follow up with OB/PCP for postpartum TSH check and dose adjustment if needed    Pertinent Physical Exam At Time of Discharge    General: Examination reveals a well developed, well nourished, female, in no acute distress. She is alert and cooperative.  Lungs: symmetrical, non-labored breathing.  Cardiac: warm, well-perfused.  Abdomen: soft, non-tender.  Fundus: firm, below umbilicus, and nontender.  Extremities: no redness or tenderness in the calves or thighs.  Neurological: alert, oriented, normal speech, no focal findings or movement disorder noted.     Last Vitals:  Temp Pulse Resp BP MAP Pulse Ox   36.7 °C (98.1 °F) 78 20 118/80 93 96 %     Discharge Meds     Your  medication list        START taking these medications        Instructions Last Dose Given Next Dose Due   acetaminophen 325 mg tablet  Commonly known as: Tylenol      Take 3 tablets (975 mg) by mouth every 6 hours.       ferrous sulfate (325 mg ferrous sulfate) tablet      Take 1 tablet (325 mg) by mouth once daily with breakfast.       ibuprofen 600 mg tablet      Take 1 tablet (600 mg) by mouth every 6 hours.       polyethylene glycol 17 gram packet  Commonly known as: Glycolax, Miralax      Take 17 g by mouth once daily.              CHANGE how you take these medications        Instructions Last Dose Given Next Dose Due   sertraline 50 mg tablet  Commonly known as: Zoloft  What changed: Another medication with the same name was added. Make sure you understand how and when to take each.           sertraline 50 mg tablet  Commonly known as: Zoloft  What changed: You were already taking a medication with the same name, and this prescription was added. Make sure you understand how and when to take each.      Take 1 tablet (50 mg) by mouth once daily at bedtime.              CONTINUE taking these medications        Instructions Last Dose Given Next Dose Due   levothyroxine 150 mcg tablet  Commonly known as: Synthroid      Take 1 tablet (150 mcg) by mouth once daily in the morning. Take before meals.       Pepcid AC 10 mg tablet  Generic drug: famotidine                  STOP taking these medications      aspirin 81 mg EC tablet        Prenatal Vitamin 27 mg iron- 0.8 mg tablet  Generic drug: prenatal vitamin (iron-folic)                  Where to Get Your Medications        These medications were sent to [facility]/pharmacy #[id number] - [place], [place] - [address] AT [address] [address]      Phone: [phone]   acetaminophen 325 mg tablet  ferrous sulfate (325 mg ferrous sulfate) tablet  ibuprofen 600 mg tablet  levothyroxine 150 mcg tablet  polyethylene glycol 17 gram packet  sertraline  50 mg tablet          Complications Requiring Follow-Up  gHTN    Test Results Pending At Discharge  Pending Labs       No current pending labs.            Outpatient Follow-Up  Future Appointments   Date Time Provider Department Center   10/2/2024  2:20 PM Michelle SENA MD [id number] [place]        spent 20 minutes in the professional and overall care of this patient.      Alaina Barrett, APRN-CNP

## 2024-09-27 NOTE — LACTATION NOTE
Lactation Consultant Note  Lactation Consultation  Reason for Consult: Follow-up assessment, Difficult latch  Consultant Name: PHOEBE Torres RN, IBCLC    Maternal Information  Has mother  before?: No  Infant to breast within first 2 hours of birth?: Yes  Exclusive Pump and Bottle Feed: No    Maternal Assessment  Breast Assessment: Medium, Symmetrical, Soft, Compressible  Nipple Assessment: Intact, Erect, Sore  Areola Assessment: Normal    Infant Assessment  Infant Behavior: Quiet alert, Readiness to feed, Feeding cues observed, Rooting response, Sucking  Infant Assessment: Other (Comment) (deferred)    Feeding Assessment  Nutrition Source: Breastmilk  Feeding Method: Nursing at the breast, Feeding expressed breastmilk, Syringe feeding  Feeding Position: Football/seated, Skin to skin, Cross - cradle, Both sides, Nipple to nose, Mother needs assistance with latch/positioning, Nose lightly touching breast, Chin tucked into breast  Suck/Feeding: Sustained, Coordinated suck/swallow/breathe, Baby led rhythmically, Audible swallowing  Latch Assessment: Moderate assistance is needed, Instructed on deep latch, Areolar attachment, Deep latch obtained, Optimal angle of mouth opening, Comfortable with no pain, Sucking and swallowing, Sucks with long jaw movement, Chin and lower lip contact breast first, Bursts of sucking, swallowing, and rest, Flanged lips, Chin moves in rhythmic motion, Comfortable latch, Frequent audible swallows    LATCH TOOL  Latch: Grasps breast, tongue down, lips flanged, rhythmic sucking  Audible Swallowing: Spontaneous and intermittent (24 hours old)  Type of Nipple: Everted (After stimulation)  Comfort (Breast/Nipple): Filling, red/small blisters/bruises, mild/moderate discomfort  Hold (Positioning): Minimal assist, teach one side, mother does other, staff holds  LATCH Score: 8    Breast Pump  Pump: Hospital grade electric pump, Double breast pumping  Frequency: 8-10 times per day  Duration:  15-20 minutes per session  Breast Shield Size and Type: 21 mm  Units of Volume: mL per session    Other OB Lactation Tools       Patient Follow-up  Inpatient Lactation Follow-up Needed : Yes  Outpatient Lactation Follow-up: Recommended    Other OB Lactation Documentation  Maternal Risk Factors: Flat or inverted nipple tissue, Hypertension,  delivery, Hypothyroidism, Other (comment) (mother reports pcos)  Infant Risk Factors: Poor or painful latch / restricted feedings    Recommendations/Summary    The mother was already breastfeeding when I came into the room. Her infant was latched to the left breast using a football hold. She was also using a small nipple shield. The mother stated that her infant had been at the breast for about 35 minutes. His latch was shallow. He had rhythmic sucking, but not many swallows. When he was removed from the breast to attempt a deeper latch, there was no colostrum seen in the shield. The mother was then assisted to latch to the right breast without a nipple shield.  We used a cross-cradle hold. After a few attempts, he latched deeply with rhythmic sucking, long jaw movements, and audible swallows. The mother reported the latch as comfortable. The mother's attention was brought to the placement of her hands to correctly support the breast and her infant's head.  She is asked to call for observation of the next latch.    Addendum: The mother called out for observation. Her infant was sleeping. He was awakened and placed skin to skin with his mother. The mother sat at the side of the bed to use a left football hold with pillow support for latching. The infant remained sleepy and did not latch. The parents are preparing for discharge to home. The mother states that she will continue to latch either with or without the shield. She will pump if she uses the shield. She plans follow-up with outpatient lactation services through the support group in [place].

## 2024-09-27 NOTE — LACTATION NOTE
Lactation Consultant Note  Lactation Consultation  Reason for Consult: Follow-up assessment, Infant < 6lbs, Multiple gestation  Consultant Name: PHOEBE Torres, RN IBCLC    Maternal Information  Has mother  before?: Yes  How long did the mother previously breastfeed?: pumped for [age]-year-old for 4 months  Previous Maternal Breastfeeding Challenges: Difficult latch  Infant to breast within first 2 hours of birth?: No  Exclusive Pump and Bottle Feed: No    Maternal Assessment  Breast Assessment: Large, Symmetrical, Soft, Compressible  Nipple Assessment: Intact, Erect  Areola Assessment: Normal    Infant Assessment       Feeding Assessment  Nutrition Source: Donor human milk, Formula (per mother’s request)  Feeding Method: Feeding expressed breastmilk, Paced bottle    LATCH TOOL       Breast Pump  Pump: Hospital grade electric pump, Double breast pumping  Frequency: Less than 3 times per day  Duration: 15-20 minutes per session  Breast Shield Size and Type: 21 mm  Volume of Milk Production: 1    Other OB Lactation Tools       Patient Follow-up       Other OB Lactation Documentation  Maternal Risk Factors: Hypertension, Obesity (pre-pregnancy BMI >30),  delivery,  delivery <37 weeks  Infant Risk Factors: Prematurity <37 weeks, Poor or painful latch / restricted feedings, Prelacteal feeds    Recommendations/Summary

## 2024-09-27 NOTE — CARE PLAN
The patient's goals for the shift include discharge    The clinical goals for the shift include maintain normal vital signs    Patient's vss, assessment stable, pain well controlled, lochia scant. Patient breast feeding and pumping often. Bonding well with infant. Patient clear for discharge.      Problem: Postpartum  Goal: Experiences normal postpartum course  Outcome: Met     Problem: Postpartum  Goal: Appropriate maternal -  bonding  Outcome: Met     Problem: Postpartum  Goal: Establish and maintain infant feeding pattern for adequate nutrition  Outcome: Met     Problem: Postpartum  Goal: No s/sx infection  Outcome: Met     Problem: Postpartum  Goal: No s/sx of hemorrhage  Outcome: Met     Problem: Postpartum  Goal: Minimal s/sx of HDP and BP<160/110  Outcome: Met     Problem: Pain - Adult  Goal: Verbalizes/displays adequate comfort level or baseline comfort level  Outcome: Met     Problem: Safety - Adult  Goal: Free from fall injury  Outcome: Met     Problem: Discharge Planning  Goal: Discharge to home or other facility with appropriate resources  Outcome: Met

## 2024-10-02 ENCOUNTER — APPOINTMENT (OUTPATIENT)
Dept: OBSTETRICS AND GYNECOLOGY | Facility: CLINIC | Age: 24
End: 2024-10-02
Payer: COMMERCIAL

## 2024-10-02 VITALS
HEIGHT: 59 IN | DIASTOLIC BLOOD PRESSURE: 84 MMHG | SYSTOLIC BLOOD PRESSURE: 123 MMHG | BODY MASS INDEX: 45.96 KG/M2 | WEIGHT: 228 LBS

## 2024-10-02 DIAGNOSIS — E03.9 HYPOTHYROIDISM, UNSPECIFIED TYPE: Primary | ICD-10-CM

## 2024-10-02 PROCEDURE — 0503F POSTPARTUM CARE VISIT: CPT | Performed by: OBSTETRICS & GYNECOLOGY

## 2024-10-02 ASSESSMENT — EDINBURGH POSTNATAL DEPRESSION SCALE (EPDS)
I HAVE FELT SAD OR MISERABLE: NO, NOT AT ALL
TOTAL SCORE: 0
I HAVE BEEN ABLE TO LAUGH AND SEE THE FUNNY SIDE OF THINGS: AS MUCH AS I ALWAYS COULD
I HAVE LOOKED FORWARD WITH ENJOYMENT TO THINGS: AS MUCH AS I EVER DID
THE THOUGHT OF HARMING MYSELF HAS OCCURRED TO ME: NEVER
I HAVE BEEN SO UNHAPPY THAT I HAVE BEEN CRYING: NO, NEVER
I HAVE BLAMED MYSELF UNNECESSARILY WHEN THINGS WENT WRONG: NO, NEVER
I HAVE FELT SCARED OR PANICKY FOR NO GOOD REASON: NO, NOT AT ALL
I HAVE BEEN ANXIOUS OR WORRIED FOR NO GOOD REASON: NO, NOT AT ALL
THINGS HAVE BEEN GETTING ON TOP OF ME: NO, I HAVE BEEN COPING AS WELL AS EVER
I HAVE BEEN SO UNHAPPY THAT I HAVE HAD DIFFICULTY SLEEPING: NOT AT ALL

## 2024-10-02 NOTE — PROGRESS NOTES
Patient presents today for her 1 week PPV BP check     Delivered on 24 via vaginal at [facility]. Baby boy, 6lb 14oz  On meds: No  C/O: none    Subjective:  Katy Roldan is a female 24 y.o. year old  who delivered at 39w1d presenting for 2 week postpartum visit complicated by GHTN.  Exclusively pumping.  Mood is good on zoloft.  Scant lochia.  Plans to review contraception with Dr Dawkins next visit.  Does not endorse headache, vision change, RUQ pain, dyspnea, or chest pain.  Normotensive at home         Objective:  Vitals:    10/02/24 1433   BP: 123/84     Gen: awake, alert  Head: NCAT  HEENT: moist mucus membranes  Pulm: breathing comfortably on room air  CV: warm and well-perfused  Neuro: alert and oriented  Psych: appropriate affect     Assessment and Plan:  Katy Roldan is a female 24 y.o. year old  who delivered at 39w1d by  presenting for 2 week postpartum visit, doing well for gHTN doing well    Problem List Items Addressed This Visit    None  Visit Diagnoses       Hypothyroidism, unspecified type    -  Primary    Relevant Orders    TSH with reflex to Free T4 if abnormal        gHTN - normotensive, no sx's preeclampsia with severe features  H/o hypothyroid can check tsh before next visit      RTC in 2 wk ppv Dr Mariza Gloria MD

## 2024-10-08 ENCOUNTER — POSTPARTUM VISIT (OUTPATIENT)
Dept: OBSTETRICS AND GYNECOLOGY | Facility: CLINIC | Age: 24
End: 2024-10-08
Payer: COMMERCIAL

## 2024-10-08 VITALS — BODY MASS INDEX: 45.97 KG/M2 | SYSTOLIC BLOOD PRESSURE: 104 MMHG | WEIGHT: 227.6 LBS | DIASTOLIC BLOOD PRESSURE: 70 MMHG

## 2024-10-08 DIAGNOSIS — R35.0 URINARY FREQUENCY: Primary | ICD-10-CM

## 2024-10-08 LAB
POC APPEARANCE, URINE: CLEAR
POC BILIRUBIN, URINE: NEGATIVE
POC BLOOD, URINE: ABNORMAL
POC COLOR, URINE: YELLOW
POC GLUCOSE, URINE: NEGATIVE MG/DL
POC KETONES, URINE: NEGATIVE MG/DL
POC LEUKOCYTES, URINE: ABNORMAL
POC NITRITE,URINE: NEGATIVE
POC PH, URINE: 6 PH
POC PROTEIN, URINE: NEGATIVE MG/DL
POC SPECIFIC GRAVITY, URINE: >=1.03
POC UROBILINOGEN, URINE: 0.2 EU/DL

## 2024-10-08 PROCEDURE — 87086 URINE CULTURE/COLONY COUNT: CPT

## 2024-10-08 ASSESSMENT — EDINBURGH POSTNATAL DEPRESSION SCALE (EPDS)
THE THOUGHT OF HARMING MYSELF HAS OCCURRED TO ME: NEVER
I HAVE BLAMED MYSELF UNNECESSARILY WHEN THINGS WENT WRONG: NO, NEVER
THINGS HAVE BEEN GETTING ON TOP OF ME: NO, I HAVE BEEN COPING AS WELL AS EVER
TOTAL SCORE: 1
I HAVE LOOKED FORWARD WITH ENJOYMENT TO THINGS: AS MUCH AS I EVER DID
I HAVE BEEN ANXIOUS OR WORRIED FOR NO GOOD REASON: NO, NOT AT ALL
I HAVE FELT SAD OR MISERABLE: NO, NOT AT ALL
I HAVE BEEN SO UNHAPPY THAT I HAVE HAD DIFFICULTY SLEEPING: NOT AT ALL
I HAVE BEEN SO UNHAPPY THAT I HAVE BEEN CRYING: ONLY OCCASIONALLY
I HAVE BEEN ABLE TO LAUGH AND SEE THE FUNNY SIDE OF THINGS: AS MUCH AS I ALWAYS COULD
I HAVE FELT SCARED OR PANICKY FOR NO GOOD REASON: NO, NOT AT ALL

## 2024-10-08 NOTE — PROGRESS NOTES
Subjective   24 y.o.  presenting for UTI symptoms (burning with urination) x3 days. Patient is 2 weeks PP.     Delivery Date: 2024  GA at Delivery: 39w1d   Type of Delivery: Vaginal, Spontaneous     Pain: controlled  Lacerations:   Laceration Repaired?   Perineal: 2nd Yes   Periurethral:       Labial:       Sulcus:       Vaginal:       Cervical:           Repair suture: 2-0 Synthetic Suture   Number of repair packets:     Blood loss (mL):     Total estimated blood loss (mL): 0     Lochia: slowing  Sexual Intimacy: No  Feeding Method: She is pumping exclusively.   Lactation Consult Needed?: No    Bonding with Baby: yes  Mood:     Postpartum Depression: Low Risk  (10/8/2024)    [place]  Depression Scale     Last EPDS Total Score: 1     Last EPDS Self Harm Result: Never     Pregnancy Problems (from 24 to present)       Problem Noted Resolved    Encounter for induction of labor 2024 by Taylor Farr MD No    Unstable lie of fetus ([facility]) 9/10/2024 by Linda Dawkins MD No    Overview Addendum 2024  5:00 PM by Linda Dawkins MD     -Transverse back up at 36 weeks, cephalic at 37 weeks  -Cephalic at 38 weeks         38 weeks gestation of pregnancy ([facility]) 2024 by Linda Dawkins MD No    Overview Addendum 2024  2:28 PM by Linda Dawkins MD     -Last pap (2022): NIL  -Declines cfDNA and NT, already had carrier testing with [facility]  -s/p tdap  -Breast/ [place] for peds/POPs vs condoms + provera withdrawal for contraception  -Last growth US (): EFW 3219 g (79%), AC 98%, HC 66%, ASHISH 12.8, anterior placenta  -s/p flu shot 24  [ ] Growth at 30 and 36wga, NSTs at 34wga, delivery in 39th week  IOL  at 2000 at 39w0d at [facility]         Transverse lie of fetus ([facility]) 2024 by Linda Dawkins MD 9/10/2024 by Linda Dawkins MD    Overview Signed 2024  9:59 AM by Linda Dawkins MD     TRANSVERSE BACK UP AT 36w3d          OBJECTIVE:   Physical  Exam  Constitutional:       General: She is not in acute distress.     Appearance: Normal appearance.   HENT:      Head: Normocephalic and atraumatic.   Eyes:      Pupils: Pupils are equal, round, and reactive to light.   Cardiovascular:      Rate and Rhythm: Normal rate.   Pulmonary:      Effort: Pulmonary effort is normal.   Musculoskeletal:         General: Normal range of motion.      Cervical back: Normal range of motion and neck supple.   Neurological:      Mental Status: She is alert.   Skin:     General: Skin is warm and dry.   Psychiatric:         Mood and Affect: Mood normal.         Behavior: Behavior normal.         Thought Content: Thought content normal.   Vitals reviewed.       Assessment and Plan:  Pt is a 24 y.o. , UTI s/s at 2wks PP  UA appreciated - will defer treatment until C&S is resulted  Comfort measures discussed.  Adjusting well, discussed PMADs and when to contact office.  Advised to call office for breast complaints, abnormal bleeding, mood changes, or other concerning symptoms. Warning s/s discussed.     F/u PP visit and as needed  Sadaf Yao, SAÚL-VIOLA

## 2024-10-10 LAB — BACTERIA UR CULT: NORMAL

## 2024-11-06 NOTE — PROGRESS NOTES
Subjective:  Katy Roldan is a female 24 y.o. year old  who delivered vaginally at 39w1d presenting for 6 week postpartum visit. Overall, she feels well. She is able to perform daily tasks without difficulty. Lochia ceased. She has not had a period since that time. She has not resumed intercourse and plans lactational amenorrhea and condoms for contraception. She feels well-supported at home. She is currently pumping exclusively and baby is doing well. Mood stable. She has gHTN not on meds and reports BPs at home normotensive.    Objective:  Vitals:    24 1512   BP: 110/75     Gen: awake, alert  Head: NCAT  HEENT: moist mucus membranes  Pulm: breathing comfortably on room air  CV: warm and well-perfused  : no blood or discharge in vault, perineum well-approximated  Neuro: alert and oriented  Psych: appropriate affect     Assessment and Plan:  Katy Roldan is a female 24 y.o. year old  who delivered vaginally at 39w1d presenting for 6 week postpartum visit, doing well.    Postpartum  -Postpartum care: may resume normal activities including exercise, work, intercourse  -Feeding: pumping exclusively   -Pap: NIL (2022), next due in 2025    PCOS  -Conceived with IUI this pregnancy  -Desiring another pregnancy in the next 1-2 years  -Reviewed recommended pregnancy spacing of 18-24 months though not unreasonable to start trying at 12 months as she has a history of infertility  -Discussed contraception today. Declines hormonal contraception. Currently desiring lactational amenorrhea and condoms. Discussed Hui for EC, agreeable, Rx'ed today  -Did also discuss endometrial protection given PCOS. Planning provera withdrawal bleed if not getting regular menses once she has completed breastfeeding. Will plan to readdress at annual exam (due in 8 months for pap smear)    Depression/Anxiety  -On zoloft 50 mg daily  -EPDS 0    gHTN  -No meds  -BPs at home normotensive  -BP today 110/75  -Now  resolved     Hypothyroid  -Was on synthroid 125mcg prior to pregnancy which was increased to 150mcg during pregnancy  -TSH ordered last visit but not drawn, to draw in office today. Discussed likely plan to reduce back to pre-pregnancy dose though will await TSH prior to doing so  -To follow-up with PCP for further dose adjustments     RTC in July for annual exam and pap    30 minutes were spent in the care of this patient. 5 minutes reviewing records, 15 minutes face to face, and 10 minutes documenting    Linda Dawkins MD

## 2024-11-07 ENCOUNTER — APPOINTMENT (OUTPATIENT)
Dept: OBSTETRICS AND GYNECOLOGY | Facility: CLINIC | Age: 24
End: 2024-11-07
Payer: COMMERCIAL

## 2024-11-07 VITALS
WEIGHT: 228.8 LBS | DIASTOLIC BLOOD PRESSURE: 75 MMHG | SYSTOLIC BLOOD PRESSURE: 110 MMHG | BODY MASS INDEX: 46.12 KG/M2 | HEIGHT: 59 IN

## 2024-11-07 DIAGNOSIS — Z09 POSTOP CHECK: Primary | ICD-10-CM

## 2024-11-07 DIAGNOSIS — E03.9 ACQUIRED HYPOTHYROIDISM: ICD-10-CM

## 2024-11-07 DIAGNOSIS — E03.9 HYPOTHYROIDISM, UNSPECIFIED TYPE: Primary | ICD-10-CM

## 2024-11-07 DIAGNOSIS — O13.9 GESTATIONAL HYPERTENSION, ANTEPARTUM (HHS-HCC): ICD-10-CM

## 2024-11-07 DIAGNOSIS — Z30.012 EMERGENCY CONTRACEPTIVE COUNSELING AND PRESCRIPTION: ICD-10-CM

## 2024-11-07 LAB
T4 FREE SERPL-MCNC: 1.31 NG/DL (ref 0.61–1.12)
TSH SERPL-ACNC: 0.04 MIU/L (ref 0.44–3.98)

## 2024-11-07 PROCEDURE — 84443 ASSAY THYROID STIM HORMONE: CPT

## 2024-11-07 PROCEDURE — 84439 ASSAY OF FREE THYROXINE: CPT

## 2024-11-07 RX ORDER — LEVOTHYROXINE SODIUM 125 UG/1
125 TABLET ORAL DAILY
Qty: 30 TABLET | Refills: 11 | Status: SHIPPED | OUTPATIENT
Start: 2024-11-07 | End: 2025-11-07

## 2024-11-07 RX ORDER — ULIPRISTAL ACETATE 30 MG/1
30 TABLET ORAL ONCE
Qty: 1 TABLET | Refills: 0 | Status: SHIPPED | OUTPATIENT
Start: 2024-11-07 | End: 2024-11-07

## 2024-11-07 ASSESSMENT — EDINBURGH POSTNATAL DEPRESSION SCALE (EPDS)
I HAVE FELT SCARED OR PANICKY FOR NO GOOD REASON: NO, NOT AT ALL
I HAVE BLAMED MYSELF UNNECESSARILY WHEN THINGS WENT WRONG: NO, NEVER
I HAVE BEEN SO UNHAPPY THAT I HAVE BEEN CRYING: NO, NEVER
I HAVE BEEN ANXIOUS OR WORRIED FOR NO GOOD REASON: NO, NOT AT ALL
I HAVE FELT SAD OR MISERABLE: NO, NOT AT ALL
I HAVE BEEN SO UNHAPPY THAT I HAVE HAD DIFFICULTY SLEEPING: NOT AT ALL
TOTAL SCORE: 0
I HAVE LOOKED FORWARD WITH ENJOYMENT TO THINGS: AS MUCH AS I EVER DID
THINGS HAVE BEEN GETTING ON TOP OF ME: NO, I HAVE BEEN COPING AS WELL AS EVER
I HAVE BEEN ABLE TO LAUGH AND SEE THE FUNNY SIDE OF THINGS: AS MUCH AS I ALWAYS COULD
THE THOUGHT OF HARMING MYSELF HAS OCCURRED TO ME: NEVER

## 2025-01-01 DIAGNOSIS — E03.9 HYPOTHYROIDISM, UNSPECIFIED TYPE: ICD-10-CM

## 2025-01-02 RX ORDER — LEVOTHYROXINE SODIUM 125 UG/1
125 TABLET ORAL DAILY
Qty: 90 TABLET | Refills: 0 | Status: SHIPPED | OUTPATIENT
Start: 2025-01-02 | End: 2026-01-02

## 2025-04-04 DIAGNOSIS — E03.9 HYPOTHYROIDISM, UNSPECIFIED TYPE: ICD-10-CM

## 2025-04-04 RX ORDER — LEVOTHYROXINE SODIUM 125 UG/1
TABLET ORAL
Qty: 90 TABLET | Refills: 0 | Status: SHIPPED | OUTPATIENT
Start: 2025-04-04

## 2025-04-21 ENCOUNTER — APPOINTMENT (OUTPATIENT)
Dept: PHYSICAL THERAPY | Facility: HOSPITAL | Age: 25
End: 2025-04-21
Payer: COMMERCIAL

## 2025-04-28 ENCOUNTER — EVALUATION (OUTPATIENT)
Dept: PHYSICAL THERAPY | Facility: HOSPITAL | Age: 25
End: 2025-04-28
Payer: COMMERCIAL

## 2025-04-28 DIAGNOSIS — N94.10 DYSPAREUNIA, FEMALE: ICD-10-CM

## 2025-04-28 DIAGNOSIS — N39.3 STRESS INCONTINENCE: Primary | ICD-10-CM

## 2025-04-28 PROCEDURE — 97110 THERAPEUTIC EXERCISES: CPT | Mod: GP

## 2025-04-28 PROCEDURE — 97161 PT EVAL LOW COMPLEX 20 MIN: CPT | Mod: GP

## 2025-04-28 NOTE — PROGRESS NOTES
Physical Therapy    Physical Therapy Evaluation and Treatment      Patient Name: Katy Roldan  MRN: [id number]  Today's Date: 4/28/2025    Time Entry:   Time Calculation  Start Time: 1656  Stop Time: 1744  Time Calculation (min): 48 min  PT Evaluation Time Entry  PT Evaluation (Low) Time Entry: 33  PT Therapeutic Procedures Time Entry  Therapeutic Exercise Time Entry: 15                 Insurance:  MMO [employer]   BMN PT OT           COPAY 0   (0) 1000(0) COVERAGE 90 OOP 6600(10) [id number](15)                                      NO AUTH REQ  REF# [id number]     Visit: 1/10    Assessment:  PT Assessment  PT Assessment Results: Decreased strength, Decreased range of motion, Decreased endurance, Decreased mobility, Pain, Obesity, Impaired tone  Rehab Prognosis: Good  Evaluation/Treatment Tolerance: Patient tolerated treatment well  Strengths: Ability to acquire knowledge, Attitude of self   Patient is a  25 y/o female presents with c/o stress urinary incontinence and dyspareunia since since childbirth. Upon examination patient demonstrates decreased B hip strength that is worse on the left, some decreased B hip ROM and likley increased tone in pelvic floor. Internal assessment to be completed next visit. limiting overall functional mobility including ability to sit on floor, maintain urine with stressful activities and have painfree intercourse.  Pt to benefit from outpatient PT to address deficits, maximize functional mobility and improve QOL.  The clinical presentation of this patient is stable and their history and examination findings are consistent with a low complexity evaluation with good rehab potential.        Plan:  OP PT Plan  Treatment/Interventions: Dry needling, Education/ Instruction, Electrical stimulation, Manual therapy, Neuromuscular re-education, Self care/ home management, Taping techniques, Therapeutic activities, Therapeutic exercises  PT Plan: Skilled PT  PT  Frequency: 1 time per week  Duration: 10 visits  Rehab Potential: Good  Plan of Care Agreement: Patient    Current Problem:   1. Stress incontinence  Follow Up In Physical Therapy      2. Dyspareunia, female  Follow Up In Physical Therapy          Subjective    General:  Medical History Form: Reviewed (scanned into chart)    Subjective:       Primary Language: English      Red Flags: Do you have any of the following? No  Fever/chills, unexplained weight changes, dizziness/fainting, unexplained change in bowel or bladder functions, unexplained malaise or muscle weakness, night pain/sweats, numbness or tingling    PELVIC HISTORY:  Chief Complaint/Description of Symptoms: post partum 23. Little boy. 39 week and 1 day.  Notes stress incontinence. Standing to running. Started following birth  Pain with intercourse. Started following birth   Started menses, but has not utilize tampon.   Symptoms began about 1 month after   Had second degree tear. Feels like she healed without complication   Just finished breastfeeding. Stopped about 1 month ago.     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 Feels pain at perineum. No pain without penetration   Pain with each time during intercourse   Location: perineum   Worst: 4/10    Since onset, the symptoms are: same   Pain when emptying bladder: no  Pain with wiping or tight clothing: yes with tight underwear   Pain with intercourse: yes  Sexual dysfunction: yes, dryness issues   History of back  "pain: chronic lumbar pain    EXERCISE:  Do you do Kegels? Yes, but stopped   Current exercise regime: no    BLADDER:  Excessive Urinary Urgency: yes   Daytime Voiding Frequency: 10-15 times daily   Nighttime Voiding Frequency: none unless she wakes up, but not get woken up to urinate. Will have to run otherwise she with urinate   Unintentional urine loss frequency: 3x/week   Leakage occurs with: laughing, coughing, sneezing   Leakage amount: Few drops     Difficulty initiating flow of urine: sometimes ~5-6 seconds   Slow/weak urine stream: no  Difficulty starting urine stream/push to urinate: yes   Able to completely empty bladder: most of the time, sometimes she will double void   Tests performed by doctor: none   Frequent UTI's: none      BOWEL:  Excessive Bowel Urgency: no  BM Frequency: 1-2x/day, sometimes 3x/day   Frequent Diarrhea: no  Frequent constipation/straining/incomplete emptying: yes (medication related)   Unintentional stool loss: none      LIFESTYLE:   Caffeine intake: yes, 2 cups a day   H2O intake: 60 ounces   Smoking: no  Alcohol: socially   Special diets? no  Sleep quality: ok    GOALS FOR PT: \"no pain during intercourse, no stress incontinence\"     General  Reason for Referral: postpartum  Referred By: Linda Dawkins  Past Medical History Relevant to Rehab: liver disease, thyroid disease  Precautions:     Vital Signs:     Pain:     Home Living:     Prior Level of Function:       Objective   Posture: forward head, rounded shoulders, increased lumbar lordosis     Lumbar AROM       Flexion: WFL       Extension: WFL       Sidebending:       R WFL   L WFL       Rotation:             RWFL  L WFL    Squat: able to complete, feels tightness in pelvic floor     SLS: able to maintain 30 seconds with ankle and hip strategy     MMT:           Hip Flex:    R 4/5  L 4/5             Hip Ext:     R 4/5  L 4-/5          Hip ABD:  R 4-/5  L 4-/5          Hip ADD:  R 4-/5  L 2+/5          Hip IR:      R " 4-/5  L 4-/5          Hip ER:     R 4-/5  L 4-/5          Knee Flex:  R 4/5  L 4-/5          Knee Ext:     R 4/5  L 4-/5          Ankle DF:    R 4/5  L 4/5          Ankle PF:    R NT/5  L NT/5          Big to ext:  R NT/5  L NT/5    ROM:          Hip Flex:    R WFL  L WFL             Hip Ext:     R WFL  L WFL          Hip ABD:  R WFL feels tight  L WFL feels tight          Hip ADD:  R WFL  L WFL          Hip IR:      R 30  L 30          Hip ER:     R WFL  L WFL          Knee Flex:  R WFL  L WFL          Knee Ext:     R -5  L -5          Ankle DF:    R WFL  L WFL          Ankle PF:    R WFL  L WFL    Muscle Length Tests          Shyanne: good                 HS 90/90: fair    Pelvic alignment: normal  TA contraction: good     Special tests           BENJAMIN: -          FADIR: -              Outcome Measures:  Female NIH: pain 8, urinary 4, QOL 4: total: 16    Treatments:  TA isometric: 10x5 seconds  Clamshell: 2x10  Reverse clamshell: 2x10    Access Code: [email or web address]  URL: [email or web address]  Date: 04/28/2025  Prepared by: Nicole Wagoner    Exercises  - Supine Transversus Abdominis Bracing - Hands on Stomach  - 1 x daily - 7 x weekly - 2 sets - 10 reps - 5 seconds  hold  - Clamshell  - 1 x daily - 7 x weekly - 2-3 sets - 10 reps  - Sidelying Reverse Clamshell  - 1 x daily - 7 x weekly - 2-3 sets - 10 reps    EDUCATION:  Outpatient Education  Individual(s) Educated: Patient  Education Provided: Anatomy, Home Exercise Program, Physiology, POC  Risk and Benefits Discussed with Patient/Caregiver/Other: yes  Patient/Caregiver Demonstrated Understanding: yes  Plan of Care Discussed and Agreed Upon: yes  Patient Response to Education: Patient/Caregiver Verbalized Understanding of Information    Goals:  STGs: 4 weeks     Pt will be able to isolate pelvic floor through ability to contract and relax PFMs on cue to improve lumbopelvic stability and continence.    Pt will be compliant with  participation in home exercise program 3-5x/week with good body mechanics to facilitate independent rehab program upon discharge.    LTGs: 10 weeks    Pt will show improvement of PFM strength by 1 MMT grade to allow for improved ability to support change in pressures in abdomen and thorax to reduce incontinence.    Pt will show improvement of PFM endurance to 15 seconds to allow for improved ability to support change in pressures in abdomen and thorax to reduce incontinence.     Pt will demonstrate improved TA activation to 15sec for reduced back pain with functional activities.     Pt will demonstrate improved strength in all deficient musculature by 1/2 MMT grade to allow for improved pelvic stability and decreased B hip and lumbar pain     Pt will report 75% improvement in RYLIE s/s with coughing, sneezing, laughing and physical activity to improve participation in ADLs and recreational activities.    Pt will report decreased NIH-CPSI score by 6 points (MCID) to demonstrate improved QOL and ability to perform ADLs. Baseline: 16

## 2025-05-05 ENCOUNTER — APPOINTMENT (OUTPATIENT)
Dept: PHYSICAL THERAPY | Facility: HOSPITAL | Age: 25
End: 2025-05-05
Payer: COMMERCIAL

## 2025-05-05 DIAGNOSIS — N39.3 STRESS INCONTINENCE: Primary | ICD-10-CM

## 2025-05-05 DIAGNOSIS — N94.10 DYSPAREUNIA, FEMALE: ICD-10-CM

## 2025-05-12 ENCOUNTER — APPOINTMENT (OUTPATIENT)
Dept: PHYSICAL THERAPY | Facility: HOSPITAL | Age: 25
End: 2025-05-12
Payer: COMMERCIAL

## 2025-05-12 DIAGNOSIS — N39.3 STRESS INCONTINENCE: Primary | ICD-10-CM

## 2025-05-12 DIAGNOSIS — N94.10 DYSPAREUNIA, FEMALE: ICD-10-CM

## 2025-05-19 ENCOUNTER — APPOINTMENT (OUTPATIENT)
Dept: PHYSICAL THERAPY | Facility: HOSPITAL | Age: 25
End: 2025-05-19
Payer: COMMERCIAL

## 2025-05-19 DIAGNOSIS — N39.3 STRESS INCONTINENCE: Primary | ICD-10-CM

## 2025-05-19 DIAGNOSIS — N94.10 DYSPAREUNIA, FEMALE: ICD-10-CM

## 2025-05-29 ENCOUNTER — APPOINTMENT (OUTPATIENT)
Dept: PHYSICAL THERAPY | Facility: HOSPITAL | Age: 25
End: 2025-05-29
Payer: COMMERCIAL

## 2025-05-29 DIAGNOSIS — N94.10 DYSPAREUNIA, FEMALE: ICD-10-CM

## 2025-05-29 DIAGNOSIS — N39.3 STRESS INCONTINENCE: Primary | ICD-10-CM

## 2025-06-03 ENCOUNTER — APPOINTMENT (OUTPATIENT)
Dept: PHYSICAL THERAPY | Facility: HOSPITAL | Age: 25
End: 2025-06-03
Payer: COMMERCIAL

## 2025-06-03 DIAGNOSIS — N94.10 DYSPAREUNIA, FEMALE: ICD-10-CM

## 2025-06-03 DIAGNOSIS — N39.3 STRESS INCONTINENCE: Primary | ICD-10-CM

## 2025-06-10 ENCOUNTER — APPOINTMENT (OUTPATIENT)
Dept: PHYSICAL THERAPY | Facility: HOSPITAL | Age: 25
End: 2025-06-10
Payer: COMMERCIAL

## 2025-06-10 DIAGNOSIS — N94.10 DYSPAREUNIA, FEMALE: ICD-10-CM

## 2025-06-10 DIAGNOSIS — N39.3 STRESS INCONTINENCE: Primary | ICD-10-CM

## 2025-06-17 ENCOUNTER — APPOINTMENT (OUTPATIENT)
Dept: PHYSICAL THERAPY | Facility: HOSPITAL | Age: 25
End: 2025-06-17
Payer: COMMERCIAL

## 2025-06-17 DIAGNOSIS — N39.3 STRESS INCONTINENCE: Primary | ICD-10-CM

## 2025-06-17 DIAGNOSIS — N94.10 DYSPAREUNIA, FEMALE: ICD-10-CM

## 2025-07-01 ENCOUNTER — APPOINTMENT (OUTPATIENT)
Dept: PHYSICAL THERAPY | Facility: HOSPITAL | Age: 25
End: 2025-07-01
Payer: COMMERCIAL

## 2025-07-01 DIAGNOSIS — N94.10 DYSPAREUNIA, FEMALE: ICD-10-CM

## 2025-07-01 DIAGNOSIS — N39.3 STRESS INCONTINENCE: Primary | ICD-10-CM

## 2025-07-08 ENCOUNTER — APPOINTMENT (OUTPATIENT)
Dept: PHYSICAL THERAPY | Facility: HOSPITAL | Age: 25
End: 2025-07-08
Payer: COMMERCIAL

## 2025-07-08 DIAGNOSIS — N39.3 STRESS INCONTINENCE: Primary | ICD-10-CM

## 2025-07-08 DIAGNOSIS — N94.10 DYSPAREUNIA, FEMALE: ICD-10-CM

## 2025-07-21 ENCOUNTER — APPOINTMENT (OUTPATIENT)
Dept: OBSTETRICS AND GYNECOLOGY | Facility: CLINIC | Age: 25
End: 2025-07-21
Payer: COMMERCIAL

## 2025-07-21 VITALS
BODY MASS INDEX: 47.12 KG/M2 | SYSTOLIC BLOOD PRESSURE: 114 MMHG | DIASTOLIC BLOOD PRESSURE: 79 MMHG | HEIGHT: 60 IN | WEIGHT: 240 LBS

## 2025-07-21 DIAGNOSIS — E28.2 PCOS (POLYCYSTIC OVARIAN SYNDROME): ICD-10-CM

## 2025-07-21 DIAGNOSIS — Z12.4 CERVICAL CANCER SCREENING: ICD-10-CM

## 2025-07-21 DIAGNOSIS — E03.9 HYPOTHYROIDISM, UNSPECIFIED TYPE: ICD-10-CM

## 2025-07-21 DIAGNOSIS — Z31.69 ENCOUNTER FOR PRECONCEPTION CONSULTATION: ICD-10-CM

## 2025-07-21 DIAGNOSIS — Z01.419 ENCOUNTER FOR BREAST AND PELVIC EXAMINATION: Primary | ICD-10-CM

## 2025-07-21 PROBLEM — Z3A.38 38 WEEKS GESTATION OF PREGNANCY (HHS-HCC): Status: RESOLVED | Noted: 2024-02-19 | Resolved: 2025-07-21

## 2025-07-21 PROBLEM — O32.0XX0 UNSTABLE LIE OF FETUS (HHS-HCC): Status: RESOLVED | Noted: 2024-09-10 | Resolved: 2025-07-21

## 2025-07-21 PROBLEM — Z34.90 ENCOUNTER FOR INDUCTION OF LABOR: Status: RESOLVED | Noted: 2024-09-23 | Resolved: 2025-07-21

## 2025-07-21 PROCEDURE — 99213 OFFICE O/P EST LOW 20 MIN: CPT | Performed by: STUDENT IN AN ORGANIZED HEALTH CARE EDUCATION/TRAINING PROGRAM

## 2025-07-21 PROCEDURE — 3008F BODY MASS INDEX DOCD: CPT | Performed by: STUDENT IN AN ORGANIZED HEALTH CARE EDUCATION/TRAINING PROGRAM

## 2025-07-21 PROCEDURE — 99395 PREV VISIT EST AGE 18-39: CPT | Performed by: STUDENT IN AN ORGANIZED HEALTH CARE EDUCATION/TRAINING PROGRAM

## 2025-07-21 PROCEDURE — 1036F TOBACCO NON-USER: CPT | Performed by: STUDENT IN AN ORGANIZED HEALTH CARE EDUCATION/TRAINING PROGRAM

## 2025-07-21 RX ORDER — PHENTERMINE HYDROCHLORIDE 37.5 MG/1
37.5 TABLET ORAL
COMMUNITY

## 2025-07-21 NOTE — PROGRESS NOTES
ANNUAL SUBJECTIVE    Katy Roldan is a 25 y.o. female who presents for annual exam today. She is 10 months postpartum and stopped breast feeding at 6 months. Her periods have returned and are monthly with peyman. She is sexually active with one male partner and is using condoms for contraception. Planning to start TTC in the next few months - has PCOS and required IUI/clomid/letrozole to conceive last pregnancy.     OBHx:  x1, SAB x2  PMHx: class 3 obesity, hypothyroid, anxiety/depression, PCOS, NAFLD   SurgHx: wisdom teeth  Meds/Allergies: reviewed and UTD  Social: non-smoker, 1-2 drinks per week  FHx: grandmother with breast cancer in her 70s, denies ovarian cancer    Last Pap (2022): NIL    OBJECTIVE  /79   Ht (!) 1.524 m (5')   Wt 109 kg (240 lb)   LMP 2025 (Exact Date)   Breastfeeding No   BMI 46.87 kg/m²     General Appearance   - consistent with stated age, well groomed and cooperative    Integumentary  - skin warm and dry without rash    Head and Neck  - normalocephalic and neck supple    Chest and Lung Exam  - normal breathing effort, no respiratory distress    Breast  - symmetry noted, no mass palpable, no skin change and no nipple discharge.    Abdomen  - soft, nontender and no hepatomegaly, splenomegaly, or mass    Female Genitourinary  - vulva normal without rash or lesion, urethral meatus unremarkable, bladder unremarkable, anus and perineum without lesion, normal vaginal rugae, no vaginal discharge, no lesions on cervix, cervix friable    Peripheral Vascular  - no edema present    ASSESSMENT/PLAN  25 y.o.  female who presents for annual exam.       Actions performed during this visit include:  - Clinical breast exam  - Clinical pelvic exam  - Pap: collected today  - Gardasil: complete  - Contraception: condoms, planning to start trying to conceive soon  - STI screening: declines  - PCP: HERMELINDO Indra    PCOS  Planning to start TTC in the next few months. Required IUI  with letrozole/clomid to conceive last pregnancy however menses are currently monthly with molimina. Encouraged use of home OPKs and if positive okay to TTC spontaneously. Did refer back to [facility] if not pregnant in the next 6 months    Hypothyroid  - On synthroid 75mcg daily  - Reviewed goal TSH < 2.5 while trying to conceive  - TSH with reflex today     Preconception Counseling  - To start PNV, will take OTC  - Class 3 obesity currently attempting to lose weight. Aware that phentermine is contraindicated in pregnancy, to discontinue once she starts trying to conceive  - Already had carrier testing through [facility]  - HBV vaccination series complete x3  - Rubella and varicella immune by titers  - Encouraged to maintain dental hygiene and schedule cleaning if overdue    RTC in 1 year or with +UPT    Linda Dawkins MD

## 2025-07-22 LAB — TSH SERPL-ACNC: 2.68 MIU/L

## 2025-07-24 DIAGNOSIS — E03.9 HYPOTHYROIDISM, UNSPECIFIED TYPE: Primary | ICD-10-CM

## 2025-07-24 RX ORDER — LEVOTHYROXINE SODIUM 75 UG/1
75 TABLET ORAL DAILY
Qty: 90 TABLET | Refills: 3 | Status: SHIPPED | OUTPATIENT
Start: 2025-07-24 | End: 2026-07-24

## 2025-08-04 LAB
CYTOLOGY CMNT CVX/VAG CYTO-IMP: NORMAL
LAB AP HPV GENOTYPE QUESTION: NO
LAB AP HPV HR: NORMAL
LABORATORY COMMENT REPORT: NORMAL
PATH REPORT.TOTAL CANCER: NORMAL